# Patient Record
Sex: FEMALE | Race: WHITE
[De-identification: names, ages, dates, MRNs, and addresses within clinical notes are randomized per-mention and may not be internally consistent; named-entity substitution may affect disease eponyms.]

---

## 2018-02-01 ENCOUNTER — HOSPITAL ENCOUNTER (OUTPATIENT)
Dept: HOSPITAL 76 - DI | Age: 73
Discharge: HOME | End: 2018-02-01
Attending: FAMILY MEDICINE
Payer: MEDICARE

## 2018-02-01 DIAGNOSIS — K42.9: Primary | ICD-10-CM

## 2018-02-01 PROCEDURE — 76705 ECHO EXAM OF ABDOMEN: CPT

## 2018-02-01 NOTE — ULTRASOUND REPORT
EXAM:

ABDOMEN ULTRASOUND LIMITED, CENTRAL ABDOMEN.

 

EXAM DATE: 2/1/2018 08:16 PM.

 

CLINICAL HISTORY: Periumbilical swelling, mass or lump.

 

COMPARISON: None.

 

TECHNIQUE: Real-time scanning was performed with static images obtained in the area of concern corres
ponding to the umbilical region. 

 

FINDINGS: 

There is a nonreducible fat filled periumbilical hernia immediately inferior to the umbilicus measuri
ng 3.6 x 5.6 x 7.3 cm. Hernia neck measures 1.83 cm. No associated fluid collection. Arterial and maritza
ous blood flow seen within the hernia.

 

IMPRESSION: Non-reducible periumbilical fat filled hernia.

 

[facility]

Referring Provider Line: [phone]

 

SITE ID: [id number]

## 2018-02-01 NOTE — ULTRASOUND PRELIMINARY REPORT
Accession: [id number]

Exam: US ABDOMEN LIMITED

 

IMPRESSION: Non-reducible periumbilical fat filled hernia.

 

[facility]

 

SITE ID: [id number]

## 2018-03-01 ENCOUNTER — HOSPITAL ENCOUNTER (OUTPATIENT)
Dept: HOSPITAL 76 - LAB.N | Age: 73
Discharge: HOME | End: 2018-03-01
Attending: FAMILY MEDICINE
Payer: MEDICARE

## 2018-03-01 DIAGNOSIS — E78.5: ICD-10-CM

## 2018-03-01 DIAGNOSIS — M85.80: Primary | ICD-10-CM

## 2018-03-01 DIAGNOSIS — I10: ICD-10-CM

## 2018-03-01 LAB
BASOPHILS NFR BLD AUTO: 0 10^3/UL (ref 0–0.1)
BASOPHILS NFR BLD AUTO: 0.5 %
EOSINOPHIL # BLD AUTO: 0.2 10^3/UL (ref 0–0.7)
EOSINOPHIL NFR BLD AUTO: 3 %
ERYTHROCYTE [DISTWIDTH] IN BLOOD BY AUTOMATED COUNT: 14.4 % (ref 12–15)
HGB UR QL STRIP: 13.4 G/DL (ref 12–16)
LYMPHOCYTES # SPEC AUTO: 1.7 10^3/UL (ref 1.5–3.5)
LYMPHOCYTES NFR BLD AUTO: 25.9 %
MCH RBC QN AUTO: 26.6 PG (ref 27–31)
MCHC RBC AUTO-ENTMCNC: 32.2 G/DL (ref 32–36)
MCV RBC AUTO: 82.7 FL (ref 81–99)
MONOCYTES # BLD AUTO: 0.5 10^3/UL (ref 0–1)
MONOCYTES NFR BLD AUTO: 8 %
NEUTROPHILS # BLD AUTO: 4.1 10^3/UL (ref 1.5–6.6)
NEUTROPHILS # SNV AUTO: 6.6 X10^3/UL (ref 4.8–10.8)
NEUTROPHILS NFR BLD AUTO: 62.6 %
PDW BLD AUTO: 9.1 FL (ref 7.9–10.8)
PLATELET # BLD: 248 10^3/UL (ref 130–450)
RBC MAR: 5.04 10^6/UL (ref 4.2–5.4)

## 2018-03-01 PROCEDURE — 82306 VITAMIN D 25 HYDROXY: CPT

## 2018-03-01 PROCEDURE — 36415 COLL VENOUS BLD VENIPUNCTURE: CPT

## 2018-03-01 PROCEDURE — 80061 LIPID PANEL: CPT

## 2018-03-01 PROCEDURE — 80053 COMPREHEN METABOLIC PANEL: CPT

## 2018-03-01 PROCEDURE — 85025 COMPLETE CBC W/AUTO DIFF WBC: CPT

## 2018-03-01 PROCEDURE — 83721 ASSAY OF BLOOD LIPOPROTEIN: CPT

## 2018-03-02 LAB
ALBUMIN DIAFP-MCNC: 4 G/DL (ref 3.2–5.5)
ALBUMIN/GLOB SERPL: 1.3 {RATIO} (ref 1–2.2)
ALP SERPL-CCNC: 61 IU/L (ref 42–121)
ALT SERPL W P-5'-P-CCNC: 12 IU/L (ref 10–60)
ANION GAP SERPL CALCULATED.4IONS-SCNC: 10 MMOL/L (ref 6–13)
AST SERPL W P-5'-P-CCNC: 18 IU/L (ref 10–42)
BILIRUB BLD-MCNC: 0.8 MG/DL (ref 0.2–1)
BUN SERPL-MCNC: 20 MG/DL (ref 6–20)
CALCIUM UR-MCNC: 9.4 MG/DL (ref 8.5–10.3)
CHLORIDE SERPL-SCNC: 101 MMOL/L (ref 101–111)
CHOLEST SERPL-MCNC: 202 MG/DL
CO2 SERPL-SCNC: 27 MMOL/L (ref 21–32)
CREAT SERPLBLD-SCNC: 0.8 MG/DL (ref 0.4–1)
GFRSERPLBLD MDRD-ARVRAT: 71 ML/MIN/{1.73_M2} (ref 89–?)
GLOBULIN SER-MCNC: 3.2 G/DL (ref 2.1–4.2)
GLUCOSE SERPL-MCNC: 88 MG/DL (ref 70–100)
HDLC SERPL-MCNC: 43 MG/DL
HDLC SERPL: 4.7 {RATIO} (ref ?–4.4)
LDLC SERPL CALC-MCNC: 121 MG/DL
LDLC/HDLC SERPL: 2.8 {RATIO} (ref ?–4.4)
PROT SPEC-MCNC: 7.2 G/DL (ref 6.7–8.2)
SODIUM SERPLBLD-SCNC: 138 MMOL/L (ref 135–145)
VLDLC SERPL-SCNC: 38 MG/DL

## 2018-03-16 ENCOUNTER — HOSPITAL ENCOUNTER (OUTPATIENT)
Dept: HOSPITAL 76 - DI | Age: 73
Discharge: HOME | End: 2018-03-16
Attending: SURGERY
Payer: MEDICARE

## 2018-03-16 DIAGNOSIS — K43.9: ICD-10-CM

## 2018-03-16 DIAGNOSIS — R07.9: Primary | ICD-10-CM

## 2018-03-16 PROCEDURE — 71260 CT THORAX DX C+: CPT

## 2018-03-16 PROCEDURE — 74177 CT ABD & PELVIS W/CONTRAST: CPT

## 2018-03-16 NOTE — CT REPORT
ABDOMEN AND PELVIS CT: 03/16/2018

 

COMPARISON: None.

 

INDICATION:  Mid chest pain and ventral hernia.

 

TECHNIQUE:  Axial imaging of the abdomen and pelvis was performed with 
intravenous contrast.  

100 mL Isovue 300.  Coronal and sagittal reformat.

 

FINDINGS:

The liver has normal contour without masses.  The spleen, pancreas, adrenal 
glands, and kidneys

appear unremarkable, apart from a tiny low attenuating focus in the right kidney
, too small to 

characterize.

 

There is a right spigelian hernia.  It contains a loop of colon without 
evidence of bowel 

obstruction.

 

The uterus appears absent.

 

There is a presacral mass.  It measures 4.8 x 3.5 cm, axially.

 

No bone lesions.

 

IMPRESSION:  THERE IS A PRESACRAL MASS AS DETAILED ABOVE.  THE DIFFERENTIAL 
CONSIDERATIONS ARE 

BROAD BUT INCLUDE PRIMARILY NEOPLASTIC PROCESSES.  THIS MAY BE FURTHER 
EVALUATED WITH MRI.

 

In accordance with CT protocol optimization, one or more of the following dose 
reduction 

techniques were utilized for this exam:  automated exposure control, adjustment 
of mA and/or KV

based on patient size, or use of iterative reconstructive technique.

 

 

DD: 03/16/2018 16:03

TD: 03/16/2018 16:22

Job #: [id number]

MTDD

## 2018-03-19 NOTE — CT REPORT
CT CHEST:  03/16/2018

 

COMPARISON:  No comparison.

 

INDICATION:  Mid chest pain and ventral hernia.

 

TECHNIQUE:  Axial imaging of the chest was performed with intravenous contrast.
  100 mL Isovue 

300.  Coronal and sagittal reformats.  MIP lung reformats.

 

FINDINGS:  The lungs appear clear.  The airways are unremarkable.  No 
pneumothorax or pleural 

effusion.  No mediastinal or hilar adenopathy.  No axillary adenopathy.  
Thyroid gland grossly 

unremarkable.

 

Limited upper abdomen appears unremarkable.

 

IMPRESSION:  NO ACUTE OR SUSPICIOUS CT ABNORMALITIES OF THE THORAX.





CT DOSE REDUCTION STATEMENT

 

In accordance with CT protocol optimization, one or more of the following dose 
reduction 

techniques were utilized for this exam:  automated exposure control, adjustment 
of mA and/or KV

based on patient size, or use of iterative reconstructive technique.

 

 

DD: 03/16/2018 16:02

TD: 03/19/2018 08:56

Job #: [id number]

MTDD

## 2018-04-09 ENCOUNTER — HOSPITAL ENCOUNTER (OUTPATIENT)
Dept: HOSPITAL 76 - DI | Age: 73
Discharge: HOME | End: 2018-04-09
Attending: SURGERY
Payer: MEDICARE

## 2018-04-09 DIAGNOSIS — R19.09: Primary | ICD-10-CM

## 2018-04-09 LAB
CREAT SERPLBLD-SCNC: 0.8 MG/DL (ref 0.4–1)
GFRSERPLBLD MDRD-ARVRAT: 71 ML/MIN/{1.73_M2} (ref 89–?)

## 2018-04-09 PROCEDURE — 82565 ASSAY OF CREATININE: CPT

## 2018-04-09 PROCEDURE — 36415 COLL VENOUS BLD VENIPUNCTURE: CPT

## 2018-04-09 PROCEDURE — 72197 MRI PELVIS W/O & W/DYE: CPT

## 2018-04-09 NOTE — MRI REPORT
EXAM:

MR PELVIS WITH AND WITHOUT CONTRAST

 

EXAM DATE: 4/9/2018 02:57 PM.

 

CLINICAL HISTORY: Presacral mass on CT scan.

 

COMPARISON: CT from 03/16/2018.

 

TECHNIQUE: Multiplanar breath-hold T1, T2, and DWI sequences obtained through the pelvis on an MR sca
nner. Images obtained before and after administration of 9 mL Gadavist intravenous contrast.

 

FINDINGS:

On this examination, dominant feature is a posterior presacral retroperitoneal mass which shows fat i
ntensity and mixtures of solid soft tissue intensity that shows low-level enhancement. Series 1101 im
age 77, series 301 image 18. This appears to lie immediately adjacent to the anterior cortex at S2 an
d S3. Please note however that there is no bony erosive change, no marrow edema, no abnormal enhancem
ent.

 

A portion of this mass is immediately adjacent to the left S4 and S5 nerve roots, although these are 
quite small. S1, S2 and S3 nerve roots bilaterally are uninvolved.

 

Uterus and adnexal structures are either very small amount likely been removed.

 

Fat-containing right paracentral anterior abdominal wall hernia is about 4 x 3.6 cm transversely and 
extends for a 6.8 cm top to bottom extent.

 

Adjacent bones are normal.

 

IMPRESSION: 

1. As on the CT scan, dominant feature in the posterior presacral retroperitoneal mass with fat and s
oft tissue intensity. No distinct cystic components or "fluid-fluid levels." Differential diagnosis i
ncludes neuroma, neurofibroma, lipoma, liposarcoma. Teratoma while possibly is considered significant
ly less likely because of its location in the retroperitoneum. 

2. No other worrisome imaging features. No aggressive appearing margins, no bony erosive or destructi
ve changes, no free fluid or adenopathy.

 

[facility]

Referring Provider Line: [phone]

 

SITE ID: [id number]

## 2018-05-01 ENCOUNTER — HOSPITAL ENCOUNTER (OUTPATIENT)
Dept: HOSPITAL 76 - SDS | Age: 73
Discharge: HOME | End: 2018-05-01
Attending: SURGERY
Payer: MEDICARE

## 2018-05-01 VITALS — DIASTOLIC BLOOD PRESSURE: 61 MMHG | SYSTOLIC BLOOD PRESSURE: 119 MMHG

## 2018-05-01 DIAGNOSIS — K62.1: Primary | ICD-10-CM

## 2018-05-01 DIAGNOSIS — K57.30: ICD-10-CM

## 2018-05-01 PROCEDURE — 0DBP8ZX EXCISION OF RECTUM, VIA NATURAL OR ARTIFICIAL OPENING ENDOSCOPIC, DIAGNOSTIC: ICD-10-PCS | Performed by: SURGERY

## 2018-05-01 PROCEDURE — 45380 COLONOSCOPY AND BIOPSY: CPT

## 2018-05-01 PROCEDURE — 88305 TISSUE EXAM BY PATHOLOGIST: CPT

## 2018-10-24 ENCOUNTER — HOSPITAL ENCOUNTER (OUTPATIENT)
Dept: HOSPITAL 76 - LAB.N | Age: 73
Discharge: HOME | End: 2018-10-24
Attending: FAMILY MEDICINE
Payer: MEDICARE

## 2018-10-24 DIAGNOSIS — I10: Primary | ICD-10-CM

## 2018-10-24 LAB
ANION GAP SERPL CALCULATED.4IONS-SCNC: 6 MMOL/L (ref 6–13)
BUN SERPL-MCNC: 23 MG/DL (ref 6–20)
CALCIUM UR-MCNC: 8.8 MG/DL (ref 8.5–10.3)
CHLORIDE SERPL-SCNC: 105 MMOL/L (ref 101–111)
CO2 SERPL-SCNC: 28 MMOL/L (ref 21–32)
CREAT SERPLBLD-SCNC: 0.7 MG/DL (ref 0.4–1)
GFRSERPLBLD MDRD-ARVRAT: 82 ML/MIN/{1.73_M2} (ref 89–?)
GLUCOSE SERPL-MCNC: 81 MG/DL (ref 70–100)
SODIUM SERPLBLD-SCNC: 139 MMOL/L (ref 135–145)

## 2018-10-24 PROCEDURE — 80048 BASIC METABOLIC PNL TOTAL CA: CPT

## 2018-10-24 PROCEDURE — 36415 COLL VENOUS BLD VENIPUNCTURE: CPT

## 2019-03-04 ENCOUNTER — HOSPITAL ENCOUNTER (OUTPATIENT)
Dept: HOSPITAL 76 - DI | Age: 74
Discharge: HOME | End: 2019-03-04
Attending: SURGERY
Payer: MEDICARE

## 2019-03-04 DIAGNOSIS — R19.09: Primary | ICD-10-CM

## 2019-03-04 PROCEDURE — 72197 MRI PELVIS W/O & W/DYE: CPT

## 2019-03-06 NOTE — MRI REPORT
Reason:  PRESACRAL MASS

Procedure Date:  03/04/2019   

Accession Number:  [id number] / [id number]                    

Procedure:  MRI - Pelvis W/WO CPT Code:  

 

FULL RESULT:

 

 

EXAM:

MRI PELVIS WITHOUT AND WITH CONTRAST

 

EXAM DATE: 3/4/2019 03:34 PM.

 

CLINICAL HISTORY: Presacral mass. Resection 2 years ago. Biopsy was 

reportedly negative for adenoma and carcinoma.

 

COMPARISON: MRI pelvis 04/09/2018, CT abdomen and pelvis 03/16/2018.

 

TECHNIQUE: Multiplanar, multisequence T1-weighted and fluid-sensitive 

sequences of the pelvis before and after administration of intravenous 

contrast. IV contrast: 10 mL Gadavist. Other: None.

 

FINDINGS:

Bones: No fractures or subluxations. No marrow edema or abnormal 

enhancement. No bone lesions.

 

Lower Lumbar Spine: Unremarkable.

 

Sacroiliac Joints: No effusion or sacroiliitis.

 

Right Hip: No acetabular retroversion. Femoral head/neck offset is within 

normal limits. No effusion.

 

Left Hip: A full-thickness tear of the anterosuperior left labrum to be 

seen on series 1201, image 121).

 

Symphysis Pubis: Unremarkable.

 

Musculature: No edema or fatty atrophy.

 

Pelvic Cavity: The patient has had a hysterectomy. The visualized bowel 

is unremarkable. A presacral mass is seen that is smaller than what was 

present on the prior examination. This layers posteriorly and only has 

some minimal posterior enhancement. It is T2 hyperintense and T1 

hypointense. This could be a combination of granulation tissue, fluid, 

and some residual of the prior tumor seen at this location. Overall, the 

abnormal soft tissue at this location measures 1.8 x 0.9 x 5.6 cm. More 

of this layers along the sacrum than was present on the prior 

examination. Again note that the previous mass at this location measured 

as much as 6.6 cm. No lymphadenopathy.

 

Other: The visualized sciatic nerves are unremarkable. No bursitis. The 

subcutaneous tissues are unremarkable. No abscess or cellulitis.

IMPRESSION:

1. Much of the presacral mass has been removed since the prior 

examination. Abnormal soft tissue anterior to the sacrum is likely a 

combination of residual tumor, postsurgical fluid, and granulation 

tissue. Recommend correlation to the prior pathology report. At present, 

the soft tissue anterior to the sacrum does not appear to have any effect 

on the adjacent bone and there is no pathologic lymphadenopathy by 

imaging.

2. Full-thickness tear in the anterior superior left labrum.

 

[facility] MUSCULOSKELETAL RADIOLOGY SECTION

## 2019-10-28 ENCOUNTER — HOSPITAL ENCOUNTER (OUTPATIENT)
Dept: HOSPITAL 76 - EMS | Age: 74
Discharge: ANOTHER HOSPITAL | End: 2019-10-28
Attending: SURGERY
Payer: MEDICARE

## 2019-10-28 ENCOUNTER — HOSPITAL ENCOUNTER (OUTPATIENT)
Dept: HOSPITAL 76 - ED | Age: 74
Discharge: ANOTHER HOSPITAL | End: 2019-10-28
Attending: ORTHOPAEDIC SURGERY
Payer: MEDICARE

## 2019-10-28 VITALS — SYSTOLIC BLOOD PRESSURE: 95 MMHG | DIASTOLIC BLOOD PRESSURE: 62 MMHG

## 2019-10-28 DIAGNOSIS — S82.51XA: Primary | ICD-10-CM

## 2019-10-28 DIAGNOSIS — M54.9: Primary | ICD-10-CM

## 2019-10-28 DIAGNOSIS — M79.671: ICD-10-CM

## 2019-10-28 DIAGNOSIS — M79.652: ICD-10-CM

## 2019-10-28 DIAGNOSIS — S80.12XA: ICD-10-CM

## 2019-10-28 DIAGNOSIS — M48.02: ICD-10-CM

## 2019-10-28 DIAGNOSIS — M47.812: ICD-10-CM
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
## 2019-10-28 DIAGNOSIS — I10: ICD-10-CM

## 2019-10-28 DIAGNOSIS — Z96.651: ICD-10-CM

## 2019-10-28 DIAGNOSIS — Z79.899: ICD-10-CM

## 2019-10-28 DIAGNOSIS — M79.651: ICD-10-CM

## 2019-10-28 DIAGNOSIS — S32.591A: ICD-10-CM

## 2019-10-28 DIAGNOSIS — S42.022A: ICD-10-CM

## 2019-10-28 DIAGNOSIS — M50.91: ICD-10-CM

## 2019-10-28 DIAGNOSIS — M79.672: ICD-10-CM
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
## 2019-10-28 DIAGNOSIS — S32.301A: ICD-10-CM

## 2019-10-28 DIAGNOSIS — S32.511A: ICD-10-CM

## 2019-10-28 LAB
ALBUMIN DIAFP-MCNC: 3.5 G/DL (ref 3.2–5.5)
ALBUMIN/GLOB SERPL: 1 {RATIO} (ref 1–2.2)
ALP SERPL-CCNC: 60 IU/L (ref 42–121)
ALT SERPL W P-5'-P-CCNC: 23 IU/L (ref 10–60)
ANION GAP SERPL CALCULATED.4IONS-SCNC: 12 MMOL/L (ref 6–13)
AST SERPL W P-5'-P-CCNC: 34 IU/L (ref 10–42)
BASOPHILS # BLD MANUAL: 0.2 10^3/UL (ref 0–0.1)
BASOPHILS NFR BLD AUTO: 0.6 %
BASOPHILS NFR SPEC MANUAL: 1 %
BILIRUB BLD-MCNC: 1.2 MG/DL (ref 0.2–1)
BUN SERPL-MCNC: 29 MG/DL (ref 6–20)
CALCIUM UR-MCNC: 8.8 MG/DL (ref 8.5–10.3)
CHLORIDE SERPL-SCNC: 100 MMOL/L (ref 101–111)
CO2 SERPL-SCNC: 25 MMOL/L (ref 21–32)
CREAT SERPLBLD-SCNC: 0.9 MG/DL (ref 0.4–1)
EOSINOPHIL # BLD MANUAL: 0.8 10^3/UL (ref 0–0.7)
EOSINOPHIL NFR BLD AUTO: 0.7 %
ERYTHROCYTE [DISTWIDTH] IN BLOOD BY AUTOMATED COUNT: 14.3 % (ref 12–15)
GFRSERPLBLD MDRD-ARVRAT: 61 ML/MIN/{1.73_M2} (ref 89–?)
GLOBULIN SER-MCNC: 3.4 G/DL (ref 2.1–4.2)
GLUCOSE SERPL-MCNC: 171 MG/DL (ref 70–100)
HGB UR QL STRIP: 10.8 G/DL (ref 12–16)
HGB UR QL STRIP: 12.9 G/DL (ref 12–16)
INR PPP: 1.1 (ref 0.8–1.2)
LIPASE SERPL-CCNC: 21 U/L (ref 22–51)
LYMPH ABN NFR BLD MANUAL: 0 %
LYMPHOBLASTS # BLD: 26 %
LYMPHOCYTES # BLD MANUAL: 5.3 10^3/UL (ref 1.5–3.5)
LYMPHOCYTES NFR BLD AUTO: 18 %
MANUAL DIF COMMENT BLD-IMP: (no result)
MCH RBC QN AUTO: 26.4 PG (ref 27–31)
MCHC RBC AUTO-ENTMCNC: 31.2 G/DL (ref 32–36)
MCV RBC AUTO: 84.6 FL (ref 81–99)
METAMYELOCYTES NFR BLD: 1 %
MONOCYTES # BLD MANUAL: 1 10^3/UL (ref 0–1)
MONOCYTES NFR BLD AUTO: 5.6 %
NEUTROPHILS # SNV AUTO: 20.4 X10^3/UL (ref 4.8–10.8)
NEUTROPHILS NFR BLD AUTO: 73 %
NEUTS BAND NFR BLD: 3 %
PDW BLD AUTO: 10.7 FL (ref 7.9–10.8)
PLAT MORPH BLD: (no result)
PLATELET # BLD: 303 10^3/UL (ref 130–450)
PLATELET BLD QL SMEAR: (no result)
PROT SPEC-MCNC: 6.9 G/DL (ref 6.7–8.2)
PROTHROM ACT/NOR PPP: 12.8 SECS (ref 9.9–12.6)
RBC MAR: 4.88 10^6/UL (ref 4.2–5.4)
RBC MORPH BLD: (no result)
SODIUM SERPLBLD-SCNC: 137 MMOL/L (ref 135–145)

## 2019-10-28 PROCEDURE — 73552 X-RAY EXAM OF FEMUR 2/>: CPT

## 2019-10-28 PROCEDURE — 74177 CT ABD & PELVIS W/CONTRAST: CPT

## 2019-10-28 PROCEDURE — 83690 ASSAY OF LIPASE: CPT

## 2019-10-28 PROCEDURE — 85610 PROTHROMBIN TIME: CPT

## 2019-10-28 PROCEDURE — 71260 CT THORAX DX C+: CPT

## 2019-10-28 PROCEDURE — 70450 CT HEAD/BRAIN W/O DYE: CPT

## 2019-10-28 PROCEDURE — 99285 EMERGENCY DEPT VISIT HI MDM: CPT

## 2019-10-28 PROCEDURE — 96361 HYDRATE IV INFUSION ADD-ON: CPT

## 2019-10-28 PROCEDURE — 96374 THER/PROPH/DIAG INJ IV PUSH: CPT

## 2019-10-28 PROCEDURE — 72125 CT NECK SPINE W/O DYE: CPT

## 2019-10-28 PROCEDURE — 27766 OPTX MEDIAL ANKLE FX: CPT

## 2019-10-28 PROCEDURE — 80053 COMPREHEN METABOLIC PANEL: CPT

## 2019-10-28 PROCEDURE — 85025 COMPLETE CBC W/AUTO DIFF WBC: CPT

## 2019-10-28 PROCEDURE — 36415 COLL VENOUS BLD VENIPUNCTURE: CPT

## 2019-10-28 PROCEDURE — 72170 X-RAY EXAM OF PELVIS: CPT

## 2019-10-28 PROCEDURE — 73630 X-RAY EXAM OF FOOT: CPT

## 2019-10-28 PROCEDURE — 85014 HEMATOCRIT: CPT

## 2019-10-28 PROCEDURE — 99284 EMERGENCY DEPT VISIT MOD MDM: CPT

## 2019-10-28 PROCEDURE — 85018 HEMOGLOBIN: CPT

## 2019-10-28 PROCEDURE — 73610 X-RAY EXAM OF ANKLE: CPT

## 2019-10-28 PROCEDURE — 73590 X-RAY EXAM OF LOWER LEG: CPT

## 2019-10-28 PROCEDURE — 0QSG04Z REPOSITION RIGHT TIBIA WITH INTERNAL FIXATION DEVICE, OPEN APPROACH: ICD-10-PCS | Performed by: ORTHOPAEDIC SURGERY
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
## 2019-10-28 NOTE — XRAY REPORT
Reason:  Right ankle ORIF medial malioli

Procedure Date:  10/28/2019   

Accession Number:  [id number] / [id number]                    

Procedure:  FL  - OR C-Arm Procedure CPT Code:  

 

FULL RESULT:

 

 

EXAM:

FLUOROSCOPIC GUIDANCE

 

EXAM DATE: 10/28/2019 06:09 PM.

 

CLINICAL HISTORY: Right ankle ORIF medial malioli.

 

COMPARISON: None.

 

FINDINGS: Multiple fluoroscopic images demonstrating fixation of right 

medial malleolar fracture.

IMPRESSION: Fluoroscopic guidance provided for right medial malleolar 

ORIF. Total fluoroscopy dose: 0.04 mGy.  Number of images: 5.

 

[facility]
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
## 2019-10-28 NOTE — XRAY REPORT
Reason:  fracture

Procedure Date:  10/28/2019   

Accession Number:  [id number] / [id number]                    

Procedure:  XR  - Ankle 3 View RT CPT Code:  

 

FULL RESULT:

 

 

EXAM:

RIGHT ANKLE RADIOGRAPHY

 

EXAM DATE: 10/28/2019 01:33 PM.

 

CLINICAL HISTORY: Fracture.

 

COMPARISON: LEG LOWER BILAT 10/28/2019 1:09 PM.

 

TECHNIQUE: 3 views.

 

FINDINGS:

Bones: Decreased bone mineralization. Plantar spur. Displaced horizontal 

medial malleoli fracture with 6 mm displacement.

 

Joints: No dislocation. Symmetric mortise. Preserved tibiotalar joint.

 

Soft Tissues: Ankle swelling, greatest medially

IMPRESSION:

1. Displaced medial malleolus fracture.

2. Decreased bone mineralization.

3. Ankle swelling, greatest medially

 

[facility]

## 2019-10-28 NOTE — OPERATIVE REPORT
DATE OF SERVICE: 10/28/2019

Physician: Richard Allen MD

 

PREOPERATIVE DIAGNOSES  

1.  Closed, angulated right medial malleolar ankle fracture. 

2.  Pelvic fracture. 

3.  Left clavicle fracture.

 

POSTOPERATIVE DIAGNOSES  

1.  Closed, angulated right medial malleolar ankle fracture. 

2.  Pelvic fracture. 

3.  Left clavicle fracture.

 

PROCEDURE PERFORMED:  Open reduction and internal fixation of right medial malleolar ankle fracture u
sing 2 cannulated screws.

 

SURGEON:  Richard Allen MD

 

ANESTHESIA:  General.

 

DESCRIPTION OF PROCEDURE:  Patient was taken to the operating room on the afternoon of 10/28/2019, wh
ere she was placed under general anesthetic in the supine position without any complications.  We masood
lied a thigh pneumatic tourniquet to the right lower extremity, but did not inflate it during our marybeth
e.  A rolled towel was placed underneath the right hip as well.  We then prepped and draped the ankle
 free in the usual fashion for our procedure.  Through a mid medial skin incision over the medial asp
ect of the ankle.  We dissected down to the fracture.  Periosteal elevator was used to strip the jeannine
osteum on the fracture border proximally and distally.  Curette used to remove the fracture hematoma 
at the fracture site as well.  Irrigation used to wash the wound.  Using towel clip reduction clamp, 
we were able to reduce the medial malleolar fracture nearly anatomically.  It was held in place with 
a second towel clip reduction clamp.  Fluoroscopic views in AP and lateral projection, showed a good 
reduction of our fracture as well.  We then proceeded to insert 2 threaded tipped guidewires under po
wer across the fracture.  We determined a 34 mm short threaded 4.0 mm cannulated screws would be util
ized.  The cannulated drill was then used to perforate the cortex using our guide pins.  After using 
our drill.  We then used the selected cannulated screws and inserted these over our inserted guide pi
ns.  We obtained good fracture and compression with these screws.  Fluoroscopic views in AP and later
al projection, showed a good reduction of our fracture.  Ankle mortise reestablished in AP and latera
l projections.  Permanent x-rays were sent to radiology.  We then removed the guide pins from the tib
ia, irrigated the wounds again with saline, then closed the wound in layers using several buried simp
le stitches of 2-0 Vicryl to approximate the subcutaneous tissues, followed by skin staples to approx
imate the medial ankle wound.  Patient then had the wound dressed with Xeroform gauze, 4 x 4's, steri
le Webril, and a short-leg posterior splint with stirrups applied to the lower extremity.  Patient tr
ansferred off the fracture table onto her bed and taken to the recovery room in satisfactory conditio
n.

 

ESTIMATED BLOOD LOSS:  50 mL

 

REPLACEMENT:  700 mL crystalloid.

 

INTRAOPERATIVE COMPLICATIONS:  None.  

 

PLAN:  Patient will be mobilized as tolerated.  We will likely only be able to do a pivot shift trans
fers from bed to chair with her left lower extremity.  Will essentially be wheelchair bound for the n
ext several weeks due to her multiple fractures.

 

 

DD: 10/28/2019 17:51

TD: 10/28/2019 17:55

Job #: [id number]
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
## 2019-10-28 NOTE — OPERATIVE REPORT
Operative Report





- General


Procedure Date: 10/28/19


Planned Procedure: 





ORIF of right medial malleolar ankle fracture


Pre-Op Diagnosis: Angulated closed right medial malleolar ankle fracture,pelvic 

& clavicle fx


Procedure Performed: 





Cannulated screw fixation of right medial malleolar ankle fracture


Post Op Diagnosis: Same





- Procedure Note


Primary Surgeon: MONTY Allen MD


Anesthesia Provider: ANI Barreto CRNA


Anesthesia Technique: General ET tube


IV Fluids (mL): 700


Estimated Blood Loss (mL): 50


Complications: 





None

## 2019-10-28 NOTE — ANESTHESIA
Pre-Anesthesia VS, & Labs





- Diagnosis





R ankle fx





- Procedure





ORIF R ankle


Vital Signs: 





                                        











Temp Pulse Resp BP Pulse Ox


 


 36 C L  79   23   95/59 L  100 


 


 10/28/19 12:30  10/28/19 15:00  10/28/19 15:00  10/28/19 15:00  10/28/19 15:00














                                        





Height                           5 ft 3 in


Weight (kg)                      90.718 kg


Body Mass Index                  35.4











- NPO


>8 hours


Last Fluid Intake: coffee w/cream @0600





- Pregnancy


Is Patient Pregnant?: No





- Lab Results


Current Lab Results: 





Laboratory Tests





10/28/19 12:47: Sodium 137, Potassium 3.5, Chloride 100 L, Carbon Dioxide 25, 

Anion Gap 12.0, BUN 29 H, Creatinine 0.9, Estimated GFR (MDRD) 61 L, Glucose 171

H, Calcium 8.8, Total Bilirubin 1.2 H, AST 34, ALT 23, Alkaline Phosphatase 60, 

Total Protein 6.9, Albumin 3.5, Globulin 3.4, Albumin/Globulin Ratio 1.0, Lipase

21 L


10/28/19 12:47: PT 12.8 H, INR 1.1


10/28/19 12:47: WBC 20.4 H, RBC 4.88, Hgb 12.9, Hct 41.3, MCV 84.6, MCH 26.4 L, 

MCHC 31.2 L, RDW 14.3, Plt Count 303, MPV 10.7, Neut # (Auto) Not Reportable, 

Lymph # (Auto) Not Reportable, Mono # (Auto) Not Reportable, Eos # (Auto) Not 

Reportable, Baso # (Auto) Not Reportable, Absolute Nucleated RBC Not Reportable,

Total Counted 100, Band Neuts % (Manual) 3, Abnorm Lymph % (Manual) 0, 

Metamyelocytes % 1 H, Nucleated RBC % Not Reportable, Neutrophils # (Manual) 

12.9 H, Lymphocytes # (Manual) 5.3 H, Monocytes # (Manual) 1.0, Eosinophils # 

(Manual) 0.8 H, Basophils # (Manual) 0.2 H, Differential Comment MANUAL 

DIFFERENTIAL, WBC Morphology 1+ SMUDGE CELLS, Platelet Estimate NORMAL (130-

450,000), Platelet Morphology NORMAL APPEARANCE, RBC Morph Micro Appear NORMAL 

APPEARANCE








Fish Bones: 


                                 10/28/19 12:47





                                 10/28/19 12:47





Home Medications and Allergies


Home Medications: 


Ambulatory Orders





Loratadine [Allergy Relief] 10 mg PO AC 10/28/19 











                                        





Hydrochlorothiazide 25 mg PO DAILY 07/20/16 


Loratadine [Allergy Relief] 10 mg PO AC 10/28/19 








Allergies/Adverse Reactions: 


                                    Allergies











Allergy/AdvReac Type Severity Reaction Status Date / Time


 


adhesive Allergy Intermediate WELTS/HIVES Verified 06/12/13 14:42


 


lisinopril AdvReac  Unknown Verified 04/30/18 13:52














Anes History & Medical History





- Anesthetic History


Anesthesia Complications: reports: No previous complications


Family history of Anesthesia Complications: Denies


Family history of Malignant Hyperthermia: Denies





- Medical History


Cardiovascular: reports: Hypertension


Pulmonary: reports: None


Gastrointestinal: reports: Other


Urinary: reports: None


Musculoskeletal: reports: Osteoarthritis


Endocrine/Autoimmune: reports: None


Blood Disorders: reports: None


Skin: reports: None


Smoking Status: Never smoker





- Surgical History


General: Colonoscopy


Urologic: Bladder surgery


Gynecologic: Hysterectomy


Orthopedic: Knee replacement





Exam


General: Alert, Oriented x3, Cooperative


Dental: WNL, Dentures full Upper


Mouth Opening: 3 Fingerbreadth


Neck Mobility: Normal


Mallampati classification: II


Respiratory: Lungs clear


Cardiovascular: Regular rate


Neurological: Normal gait, Normal speech


Mental/Cognitive Status: Alert/Oriented X3, Normal for patient


Cognitive Status: Within normal limits





Plan


Anesthesia Type: General


Consent for Procedure(s) Verified and Reviewed: Yes


Code Status: Attempt Resuscitation


ASA classification: 2-Mild systemic disease


Is this case an emergency?: Yes
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
## 2020-03-15 ENCOUNTER — HOSPITAL ENCOUNTER (EMERGENCY)
Dept: HOSPITAL 76 - ED | Age: 75
Discharge: HOME | End: 2020-03-15
Payer: MEDICARE

## 2020-03-15 VITALS — SYSTOLIC BLOOD PRESSURE: 140 MMHG | DIASTOLIC BLOOD PRESSURE: 72 MMHG

## 2020-03-15 DIAGNOSIS — M19.029: ICD-10-CM

## 2020-03-15 DIAGNOSIS — L50.0: Primary | ICD-10-CM

## 2020-03-15 DIAGNOSIS — Z96.659: ICD-10-CM

## 2020-03-15 DIAGNOSIS — I10: ICD-10-CM

## 2020-03-15 DIAGNOSIS — T50.905A: ICD-10-CM

## 2020-03-15 PROCEDURE — 99284 EMERGENCY DEPT VISIT MOD MDM: CPT
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
## 2020-03-18 ENCOUNTER — HOSPITAL ENCOUNTER (EMERGENCY)
Dept: HOSPITAL 76 - ED | Age: 75
Discharge: HOME | End: 2020-03-18
Payer: MEDICARE

## 2020-03-18 ENCOUNTER — HOSPITAL ENCOUNTER (OUTPATIENT)
Dept: HOSPITAL 76 - LAB.N | Age: 75
Discharge: HOME | End: 2020-03-18
Attending: FAMILY MEDICINE
Payer: MEDICARE

## 2020-03-18 VITALS — DIASTOLIC BLOOD PRESSURE: 92 MMHG | SYSTOLIC BLOOD PRESSURE: 161 MMHG

## 2020-03-18 DIAGNOSIS — E78.5: Primary | ICD-10-CM

## 2020-03-18 DIAGNOSIS — I10: ICD-10-CM

## 2020-03-18 DIAGNOSIS — E78.5: ICD-10-CM

## 2020-03-18 DIAGNOSIS — M62.830: Primary | ICD-10-CM

## 2020-03-18 LAB
ALBUMIN DIAFP-MCNC: 4.2 G/DL (ref 3.2–5.5)
ALBUMIN/GLOB SERPL: 1.1 {RATIO} (ref 1–2.2)
ALP SERPL-CCNC: 64 IU/L (ref 42–121)
ALT SERPL W P-5'-P-CCNC: 16 IU/L (ref 10–60)
ANION GAP SERPL CALCULATED.4IONS-SCNC: 11 MMOL/L (ref 6–13)
AST SERPL W P-5'-P-CCNC: 16 IU/L (ref 10–42)
BASOPHILS NFR BLD AUTO: 0 10^3/UL (ref 0–0.1)
BASOPHILS NFR BLD AUTO: 0.4 %
BILIRUB BLD-MCNC: 0.4 MG/DL (ref 0.2–1)
BUN SERPL-MCNC: 36 MG/DL (ref 6–20)
CALCIUM UR-MCNC: 9.7 MG/DL (ref 8.5–10.3)
CHLORIDE SERPL-SCNC: 99 MMOL/L (ref 101–111)
CHOLEST SERPL-MCNC: 214 MG/DL
CO2 SERPL-SCNC: 28 MMOL/L (ref 21–32)
CREAT SERPLBLD-SCNC: 0.7 MG/DL (ref 0.4–1)
EOSINOPHIL # BLD AUTO: 0 10^3/UL (ref 0–0.7)
EOSINOPHIL NFR BLD AUTO: 0.1 %
ERYTHROCYTE [DISTWIDTH] IN BLOOD BY AUTOMATED COUNT: 17.6 % (ref 12–15)
GLOBULIN SER-MCNC: 3.7 G/DL (ref 2.1–4.2)
GLUCOSE SERPL-MCNC: 111 MG/DL (ref 70–100)
HDLC SERPL-MCNC: 47 MG/DL
HDLC SERPL: 4.6 {RATIO} (ref ?–4.4)
HGB UR QL STRIP: 13 G/DL (ref 12–16)
LDLC SERPL CALC-MCNC: 113 MG/DL
LDLC/HDLC SERPL: 2.4 {RATIO} (ref ?–4.4)
LYMPHOCYTES # SPEC AUTO: 2.4 10^3/UL (ref 1.5–3.5)
LYMPHOCYTES NFR BLD AUTO: 24.9 %
MCH RBC QN AUTO: 25.7 PG (ref 27–31)
MCHC RBC AUTO-ENTMCNC: 30 G/DL (ref 32–36)
MCV RBC AUTO: 85.8 FL (ref 81–99)
MONOCYTES # BLD AUTO: 0.7 10^3/UL (ref 0–1)
MONOCYTES NFR BLD AUTO: 7.1 %
NEUTROPHILS # BLD AUTO: 6.4 10^3/UL (ref 1.5–6.6)
NEUTROPHILS # SNV AUTO: 9.7 X10^3/UL (ref 4.8–10.8)
NEUTROPHILS NFR BLD AUTO: 65.8 %
PDW BLD AUTO: 11.9 FL (ref 7.9–10.8)
PLATELET # BLD: 405 10^3/UL (ref 130–450)
PROT SPEC-MCNC: 7.9 G/DL (ref 6.7–8.2)
RBC MAR: 5.06 10^6/UL (ref 4.2–5.4)
SODIUM SERPLBLD-SCNC: 138 MMOL/L (ref 135–145)
VLDLC SERPL-SCNC: 54 MG/DL

## 2020-03-18 PROCEDURE — 36415 COLL VENOUS BLD VENIPUNCTURE: CPT

## 2020-03-18 PROCEDURE — 83721 ASSAY OF BLOOD LIPOPROTEIN: CPT

## 2020-03-18 PROCEDURE — 80053 COMPREHEN METABOLIC PANEL: CPT

## 2020-03-18 PROCEDURE — 85025 COMPLETE CBC W/AUTO DIFF WBC: CPT

## 2020-03-18 PROCEDURE — 80061 LIPID PANEL: CPT

## 2020-03-18 PROCEDURE — 96372 THER/PROPH/DIAG INJ SC/IM: CPT

## 2020-03-18 PROCEDURE — 84443 ASSAY THYROID STIM HORMONE: CPT

## 2020-03-18 PROCEDURE — 99284 EMERGENCY DEPT VISIT MOD MDM: CPT

## 2020-03-18 PROCEDURE — 99283 EMERGENCY DEPT VISIT LOW MDM: CPT
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
## 2020-04-11 ENCOUNTER — HOSPITAL ENCOUNTER (OUTPATIENT)
Dept: HOSPITAL 76 - DI | Age: 75
Discharge: HOME | End: 2020-04-11
Attending: PHYSICIAN ASSISTANT
Payer: MEDICARE

## 2020-04-11 DIAGNOSIS — R60.0: Primary | ICD-10-CM

## 2020-04-11 PROCEDURE — 76882 US LMTD JT/FCL EVL NVASC XTR: CPT
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
## 2020-05-18 ENCOUNTER — HOSPITAL ENCOUNTER (OUTPATIENT)
Dept: HOSPITAL 76 - DI.WCP | Age: 75
Discharge: HOME | End: 2020-05-18
Attending: ORTHOPAEDIC SURGERY
Payer: MEDICARE

## 2020-05-18 DIAGNOSIS — S82.891A: Primary | ICD-10-CM

## 2020-05-19 NOTE — XRAY REPORT
Reason:  RIGHT ANKLE FRACTURE

Procedure Date:  05/18/2020   

Accession Number:  [id number] / [id number]                    

Procedure:  WCP - Ankle 3 View RT CPT Code:  

 

***Final Report***

 

 

FULL RESULT:

 

 

EXAM:

RIGHT ANKLE RADIOGRAPHY

 

EXAM DATE: 5/18/2020 02:42 PM.

 

CLINICAL HISTORY: RIGHT ANKLE FRACTURE.

 

COMPARISON: ANKLE 3 VIEW RT 10/28/2019 1:20 PM.

 

TECHNIQUE: 3 views.

 

FINDINGS:

Bones:  Interval ORIF of medial malleolar avulsion utilizing 2 screws. 

Excellent alignment.

 

Potential lateral talar process fracture.

 

Joints: Normal. No effusion. No subluxations. The ankle mortise is 

normally aligned.

 

Soft Tissues: Diffuse soft tissue swelling. Plantar calcaneal 

enthesophyte.

IMPRESSION:

1. Interval ORIF of right ankle medial malleolar avulsion utilizing 2 

screws. Excellent alignment.

2. Potential lateral talar process fracture. Consider CT to better 

characterize.

 

[facility]

## 2020-06-11 ENCOUNTER — HOSPITAL ENCOUNTER (OUTPATIENT)
Dept: HOSPITAL 76 - DI | Age: 75
Discharge: HOME | End: 2020-06-11
Attending: FAMILY MEDICINE
Payer: MEDICARE

## 2020-06-11 DIAGNOSIS — M19.011: Primary | ICD-10-CM

## 2020-06-11 NOTE — XRAY REPORT
Reason:  SHOULDER JOINT PAIN, RIGHT

Procedure Date:  06/11/2020   

Accession Number:  [id number] / [id number]                    

Procedure:  XR  - Shoulder 3 View RT CPT Code:  

 

***Final Report***

 

 

FULL RESULT:

 

 

PROCEDURE:  Shoulder 3 View RT

 

INDICATIONS:  SHOULDER JOINT PAIN, RIGHT

 

TECHNIQUE:  3 views of the shoulder were acquired.

 

COMPARISON:  None.

 

FINDINGS:

 

Bones:  No fractures or dislocations.  No suspicious bony lesions.  

Visualized ribs appear intact.  Mild periarticular osteophyte formation 

at the acromioclavicular and glenohumeral joints.

 

Soft tissues:  No suspicious soft tissue calcifications.

 

IMPRESSION:  Osteoarthritis. No acute fracture. No osseous lesion. If 

symptoms and/or clinical suspicion for pathology continue, further 

assessment with repeat plain films, or advanced imaging (e.g., CT, MRI, 

or bone scan) is recommended for further assessment.

 

Reviewed by: Jennifer Santos MD on 6/11/2020 1:26 PM PDT

Approved by: Jennifer Santos MD on 6/11/2020 1:26 PM PDT

 

 

Station ID:  [id number]

## 2020-06-12 ENCOUNTER — HOSPITAL ENCOUNTER (OUTPATIENT)
Dept: HOSPITAL 76 - LAB | Age: 75
Discharge: HOME | End: 2020-06-12
Attending: SURGERY
Payer: MEDICARE

## 2020-06-12 DIAGNOSIS — T79.2XXS: Primary | ICD-10-CM

## 2020-06-12 DIAGNOSIS — Z11.59: ICD-10-CM

## 2020-06-12 PROCEDURE — 81599 UNLISTED MAAA: CPT

## 2020-06-16 ENCOUNTER — HOSPITAL ENCOUNTER (OUTPATIENT)
Dept: HOSPITAL 76 - DI | Age: 75
Discharge: HOME | End: 2020-06-16
Attending: PHYSICIAN ASSISTANT
Payer: MEDICARE

## 2020-06-16 DIAGNOSIS — M47.814: Primary | ICD-10-CM

## 2020-06-16 DIAGNOSIS — M47.816: ICD-10-CM

## 2020-06-16 DIAGNOSIS — M85.88: ICD-10-CM

## 2020-06-16 PROCEDURE — 72072 X-RAY EXAM THORAC SPINE 3VWS: CPT

## 2020-06-16 PROCEDURE — 72100 X-RAY EXAM L-S SPINE 2/3 VWS: CPT

## 2020-06-16 NOTE — XRAY REPORT
PROCEDURE:  Lumbar Spine 2 View

 

INDICATIONS:  THORACIC   LOW BACK PAIN,ACUTE

 

TECHNIQUE:  2 views of the lumbar spine were acquired.  

 

COMPARISON:  CT abdomen and pelvis dated 10/28/2019.

 

FINDINGS:  

 

Bones:  5 non-rib-bearing vertebrae are present.  Diffuse osteopenia. There is normal bony alignment.
  No acute vertebral body compression fractures.  No suspicious bony lesions.  Status post surgical f
ixation across the right sacroiliac joint with 2 fixation screws. No evidence for hardware loosening 
or failure. Multilevel lumbar spondylitic changes with chronic appearing anterior compression deformi
ty of the L1 vertebral body. This is not significantly changed from comparison CT of 10/20/2019. Heal
ing fracture deformities of the right superior and inferior pubic rami.

 

Soft tissues:  Overlying bowel gas pattern is normal.  No suspicious soft tissue calcifications.  

 

IMPRESSION:  

 

1. Lumbar spine without acute fracture or malalignment.

 

2. Multilevel lumbar spondylosis.

 

3. Status post ORIF of right pelvic fracture. No hardware complication.

 

Reviewed by: Femi Alfaro MD on 6/16/2020 1:16 PM PDT

Approved by: Feim Alfaro MD on 6/16/2020 1:16 PM PDT

 

 

Station ID:  [id number]

## 2020-06-16 NOTE — XRAY REPORT
PROCEDURE:  Thoracic Spine 3 View

 

INDICATIONS:  THORACIC   LOW BACK PAIN,ACUTE

 

TECHNIQUE:  3 views of the thoracic spine were acquired.  

 

COMPARISON:  None.

 

FINDINGS:  

 

Bones:  Diffuse osteopenia. Multilevel thoracic spondylitic changes throughout the imaged spine. No f
ractures or dislocations.  No suspicious bony lesions.  12 pairs of ribs are noted, and appear intact
 where visualized.  

 

Soft tissues:  No paravertebral stripe thickening.  

 

IMPRESSION:  

Thoracic spine without acute fracture. Diffuse osteopenia. Multilevel thoracic spondylosis.

 

Reviewed by: Femi Alfaro MD on 6/16/2020 12:57 PM PDT

Approved by: Femi Alfaro MD on 6/16/2020 12:57 PM PDT

 

 

Station ID:  [id number]

## 2020-07-15 ENCOUNTER — HOSPITAL ENCOUNTER (OUTPATIENT)
Dept: HOSPITAL 76 - DI | Age: 75
Discharge: HOME | End: 2020-07-15
Attending: PHYSICIAN ASSISTANT
Payer: MEDICARE

## 2020-07-15 DIAGNOSIS — S32.301A: Primary | ICD-10-CM

## 2020-07-15 PROCEDURE — 72190 X-RAY EXAM OF PELVIS: CPT

## 2020-07-15 NOTE — XRAY REPORT
PROCEDURE:  Pelvis 3 View

 

INDICATIONS:  UNSPECIFIED FRACTURE OF RT ILIUM

 

TECHNIQUE:  3 view(s) of the pelvis acquired.  

 

COMPARISON:  Prior CT scanning of the abdomen and pelvis 10/28/2019..

 

FINDINGS:  

 

Bones:  Intraoperative evaluation for right sacroiliac joint fixation by transverse cancellous screws
, with inlet and outlet views) with partially visualized fractures of the right obturator ring, chron
icity uncertain, best seen on the inlet view. These appear to have been present on CT scanning 10/28/
2019.

 

Soft tissues:  Visualized bowel gas pattern is normal.  No suspicious soft tissue calcifications.  

 

IMPRESSION:  Sequence of 3 intraoperative images documenting right sacroiliac joint fracture fixation
 near normal anatomic alignment is established..

 

Reviewed by: Davy Swain MD on 7/15/2020 5:07 PM PDT

Approved by: Davy Swain MD on 7/15/2020 5:07 PM PDT

 

 

Station ID:  [id number]

## 2020-07-22 ENCOUNTER — HOSPITAL ENCOUNTER (EMERGENCY)
Dept: HOSPITAL 76 - ED | Age: 75
Discharge: ANOTHER HOSPITAL | End: 2020-07-22
Payer: MEDICARE

## 2020-07-22 VITALS — SYSTOLIC BLOOD PRESSURE: 97 MMHG | DIASTOLIC BLOOD PRESSURE: 80 MMHG

## 2020-07-22 DIAGNOSIS — T84.228A: Primary | ICD-10-CM
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
## 2020-07-22 DIAGNOSIS — M96.840: ICD-10-CM

## 2020-07-22 DIAGNOSIS — I10: ICD-10-CM

## 2020-07-22 LAB
ALBUMIN DIAFP-MCNC: 4.5 G/DL (ref 3.2–5.5)
ALBUMIN/GLOB SERPL: 1.3 {RATIO} (ref 1–2.2)
ALP SERPL-CCNC: 80 IU/L (ref 42–121)
ALT SERPL W P-5'-P-CCNC: 13 IU/L (ref 10–60)
ANION GAP SERPL CALCULATED.4IONS-SCNC: 10 MMOL/L (ref 6–13)
APTT PPP: 30.5 SECS (ref 24.9–33.3)
AST SERPL W P-5'-P-CCNC: 15 IU/L (ref 10–42)
BASOPHILS NFR BLD AUTO: 0 10^3/UL (ref 0–0.1)
BASOPHILS NFR BLD AUTO: 0.5 %
BILIRUB BLD-MCNC: 0.9 MG/DL (ref 0.2–1)
BUN SERPL-MCNC: 25 MG/DL (ref 6–20)
CALCIUM UR-MCNC: 9.7 MG/DL (ref 8.5–10.3)
CHLORIDE SERPL-SCNC: 101 MMOL/L (ref 101–111)
CLARITY UR REFRACT.AUTO: CLEAR
CO2 SERPL-SCNC: 30 MMOL/L (ref 21–32)
CREAT SERPLBLD-SCNC: 0.8 MG/DL (ref 0.4–1)
EOSINOPHIL # BLD AUTO: 0.3 10^3/UL (ref 0–0.7)
EOSINOPHIL NFR BLD AUTO: 3.9 %
ERYTHROCYTE [DISTWIDTH] IN BLOOD BY AUTOMATED COUNT: 14.7 % (ref 12–15)
GLOBULIN SER-MCNC: 3.6 G/DL (ref 2.1–4.2)
GLUCOSE SERPL-MCNC: 102 MG/DL (ref 70–100)
GLUCOSE UR QL STRIP.AUTO: NEGATIVE MG/DL
HGB UR QL STRIP: 12.7 G/DL (ref 12–16)
INR PPP: 1.1 (ref 0.8–1.2)
KETONES UR QL STRIP.AUTO: NEGATIVE MG/DL
LYMPHOCYTES # SPEC AUTO: 1.4 10^3/UL (ref 1.5–3.5)
LYMPHOCYTES NFR BLD AUTO: 20.8 %
MCH RBC QN AUTO: 27.4 PG (ref 27–31)
MCHC RBC AUTO-ENTMCNC: 31.7 G/DL (ref 32–36)
MCV RBC AUTO: 86.4 FL (ref 81–99)
MONOCYTES # BLD AUTO: 0.5 10^3/UL (ref 0–1)
MONOCYTES NFR BLD AUTO: 7.7 %
NEUTROPHILS # BLD AUTO: 4.3 10^3/UL (ref 1.5–6.6)
NEUTROPHILS # SNV AUTO: 6.5 X10^3/UL (ref 4.8–10.8)
NEUTROPHILS NFR BLD AUTO: 66.6 %
NITRITE UR QL STRIP.AUTO: POSITIVE
PDW BLD AUTO: 9.7 FL (ref 7.9–10.8)
PH UR STRIP.AUTO: 6.5 PH (ref 5–7.5)
PLATELET # BLD: 286 10^3/UL (ref 130–450)
PROT SPEC-MCNC: 8.1 G/DL (ref 6.7–8.2)
PROT UR STRIP.AUTO-MCNC: NEGATIVE MG/DL
PROTHROM ACT/NOR PPP: 12.3 SECS (ref 9.9–12.6)
RBC # UR STRIP.AUTO: NEGATIVE /UL
RBC MAR: 4.64 10^6/UL (ref 4.2–5.4)
SODIUM SERPLBLD-SCNC: 141 MMOL/L (ref 135–145)
SP GR UR STRIP.AUTO: 1.01 (ref 1–1.03)
SQUAMOUS URNS QL MICRO: (no result)
UROBILINOGEN UR QL STRIP.AUTO: (no result) E.U./DL
UROBILINOGEN UR STRIP.AUTO-MCNC: NEGATIVE MG/DL
WBC CLUMPS URNS QL MICRO: PRESENT

## 2020-07-22 PROCEDURE — 99284 EMERGENCY DEPT VISIT MOD MDM: CPT

## 2020-07-22 PROCEDURE — 81003 URINALYSIS AUTO W/O SCOPE: CPT

## 2020-07-22 PROCEDURE — 85025 COMPLETE CBC W/AUTO DIFF WBC: CPT

## 2020-07-22 PROCEDURE — 99285 EMERGENCY DEPT VISIT HI MDM: CPT

## 2020-07-22 PROCEDURE — 96374 THER/PROPH/DIAG INJ IV PUSH: CPT

## 2020-07-22 PROCEDURE — 81001 URINALYSIS AUTO W/SCOPE: CPT

## 2020-07-22 PROCEDURE — 72193 CT PELVIS W/DYE: CPT

## 2020-07-22 PROCEDURE — 80053 COMPREHEN METABOLIC PANEL: CPT

## 2020-07-22 PROCEDURE — 87086 URINE CULTURE/COLONY COUNT: CPT

## 2020-07-22 PROCEDURE — 87181 SC STD AGAR DILUTION PER AGT: CPT

## 2020-07-22 PROCEDURE — 85610 PROTHROMBIN TIME: CPT

## 2020-07-22 PROCEDURE — 85730 THROMBOPLASTIN TIME PARTIAL: CPT

## 2020-07-22 NOTE — ED PHYSICIAN DOCUMENTATION
History of Present Illness





- Stated complaint


Stated Complaint: UNKNOWN BRUISING





- Chief complaint


Chief Complaint: General





- History obtained from


History obtained from: Patient





- History of Present Illness


Timing: How many weeks ago (1)


Pain level max: 6


Pain level now: 5





- Additonal information


Additional information: 





75-year-old female presents to the emergency department stating that she had 

surgery in her pelvis for a pelvic fracture at [facility] in 

2019.  The surgery was performed by Dr. Anne. For the past few

weeks she is been noticing some pain in the hip, started having bruising a week 

ago that is steadily progressed since that time.  She saw her doctor last week 

and an x-ray was performed on 7/15.  She states that she was told the x-ray was 

"fine" and did not need any further follow-up.  Bruising and pain are 

increasing. Worse with movement, better with rest.  Not on blood thinners





Review of Systems


Ten Systems: 10 systems reviewed and negative


Constitutional: denies: Fever, Chills


GI: denies: Vomiting, Diarrhea


Skin: denies: Rash


Musculoskeletal: denies: Neck pain


Neurologic: denies: Focal weakness, Numbness, Headache





PD PAST MEDICAL HISTORY





- Past Medical History


Past Medical History: Yes


Cardiovascular: Hypertension, Murmur


Respiratory: None


Neuro: None


Endocrine/Autoimmune: None


GI: None, Other


GYN: None


: Incontinence


HEENT: Chronic vision loss, Other


Psych: Depression, Anxiety


Musculoskeletal: Osteoarthritis, Chronic back pain


Derm: None





- Past Surgical History


Past Surgical History: Yes


General: Colonoscopy


Ortho: Knee replacement, Other


/GYN:  section, Hysterectomy


HEENT: Cataracts





- Present Medications


Home Medications: 


                                Ambulatory Orders











 Medication  Instructions  Recorded  Confirmed


 


Ascorbic Acid [Vitamin C] 500 mg PO DAILY 03/15/20 06/12/20


 


Cholecalciferol (Vitamin D3) 2,000 unit PO DAILY 03/15/20 06/12/20





[Vitamin D3]   


 


Cyclobenzaprine HCl 5 mg PO Q6HR PRN 03/15/20 06/12/20


 


diphenhydrAMINE [Benadryl] 25 mg PO Q4-6H PRN #30 capsule 03/15/20 06/12/20


 


hydroCHLOROthiazide 25 mg DAILY 03/15/20 06/12/20





[Hydrochlorothiazide]   


 


Calcium Carbonate [Calcium] 600 mg PO BID 20


 


Diclofenac Sodium [Diclofenac 300 mg PO BID 20





Sodium ER]   


 


Furosemide 20 mg PO DAILY 20


 


Gabapentin 300 mg PO TID PRN 20


 


Simvastatin 10 mg PO QPM 20














- Allergies


Allergies/Adverse Reactions: 


                                    Allergies











Allergy/AdvReac Type Severity Reaction Status Date / Time


 


adhesive Allergy Intermediate WELTS/HIVES Verified 20 10:43


 


lisinopril AdvReac  cough Verified 20 10:43














- Social History


Does the pt smoke?: No


Smoking Status: Never smoker


Does the pt drink ETOH?: No


Does the pt have substance abuse?: No





- Immunizations


Immunizations are current?: Yes


Immunizations: TDAP >10years/unknown





PD ED PE NORMAL





- Vitals


Vital signs reviewed: Yes





- General


General: Alert and oriented X 3, No acute distress





- HEENT


HEENT: Moist mucous membranes





- Neck


Neck: Supple, no meningeal sign





- Cardiac


Cardiac: RRR





- Respiratory


Respiratory: No respiratory distress, Clear bilaterally





- Abdomen


Abdomen: Soft, Non tender, Non distended





- Derm


Derm: Warm and dry





- Extremities


Extremities: Other (Ecchymosis over the right hip with swelling and tenderness.)





- Neuro


Neuro: Alert and oriented X 3





Results





- Vitals


Vitals: 


                               Vital Signs - 24 hr











  20





  10:43 11:08 14:30


 


Temperature 37.2 C 36.8 C 36.7 C


 


Heart Rate 91 84 83


 


Respiratory 16 16 14





Rate   


 


Blood Pressure 118/88 H 154/80 H 137/92 H


 


O2 Saturation 96 93 97








                                     Oxygen











O2 Source                      Room air

















- Labs


Labs: 


                                Laboratory Tests











  20





  11:25 11:25 11:25


 


WBC  6.5  


 


RBC  4.64  


 


Hgb  12.7  


 


Hct  40.1  


 


MCV  86.4  


 


MCH  27.4  


 


MCHC  31.7 L  


 


RDW  14.7  


 


Plt Count  286  


 


MPV  9.7  


 


Neut # (Auto)  4.3  


 


Lymph # (Auto)  1.4 L  


 


Mono # (Auto)  0.5  


 


Eos # (Auto)  0.3  


 


Baso # (Auto)  0.0  


 


Absolute Nucleated RBC  0.00  


 


Nucleated RBC %  0.0  


 


PT   12.3 


 


INR   1.1 


 


APTT   30.5 


 


Sodium    141


 


Potassium    4.2


 


Chloride    101


 


Carbon Dioxide    30


 


Anion Gap    10.0


 


BUN    25 H


 


Creatinine    0.8


 


Estimated GFR (MDRD)    70 L


 


Glucose    102 H


 


Calcium    9.7


 


Total Bilirubin    0.9


 


AST    15


 


ALT    13


 


Alkaline Phosphatase    80


 


Total Protein    8.1


 


Albumin    4.5


 


Globulin    3.6


 


Albumin/Globulin Ratio    1.3


 


Urine Color   


 


Urine Clarity   


 


Urine pH   


 


Ur Specific Gravity   


 


Urine Protein   


 


Urine Glucose (UA)   


 


Urine Ketones   


 


Urine Occult Blood   


 


Urine Nitrite   


 


Urine Bilirubin   


 


Urine Urobilinogen   


 


Ur Leukocyte Esterase   


 


Urine RBC   


 


Urine WBC   


 


Urine WBC Clumps   


 


Ur Squamous Epith Cells   


 


Urine Bacteria   


 


Ur Microscopic Review   


 


Urine Culture Comments   














  20





  12:15


 


WBC 


 


RBC 


 


Hgb 


 


Hct 


 


MCV 


 


MCH 


 


MCHC 


 


RDW 


 


Plt Count 


 


MPV 


 


Neut # (Auto) 


 


Lymph # (Auto) 


 


Mono # (Auto) 


 


Eos # (Auto) 


 


Baso # (Auto) 


 


Absolute Nucleated RBC 


 


Nucleated RBC % 


 


PT 


 


INR 


 


APTT 


 


Sodium 


 


Potassium 


 


Chloride 


 


Carbon Dioxide 


 


Anion Gap 


 


BUN 


 


Creatinine 


 


Estimated GFR (MDRD) 


 


Glucose 


 


Calcium 


 


Total Bilirubin 


 


AST 


 


ALT 


 


Alkaline Phosphatase 


 


Total Protein 


 


Albumin 


 


Globulin 


 


Albumin/Globulin Ratio 


 


Urine Color  YELLOW


 


Urine Clarity  CLEAR


 


Urine pH  6.5


 


Ur Specific Gravity  1.010


 


Urine Protein  NEGATIVE


 


Urine Glucose (UA)  NEGATIVE


 


Urine Ketones  NEGATIVE


 


Urine Occult Blood  NEGATIVE


 


Urine Nitrite  POSITIVE H


 


Urine Bilirubin  NEGATIVE


 


Urine Urobilinogen  0.2 (NORMAL)


 


Ur Leukocyte Esterase  SMALL H


 


Urine RBC  None Seen


 


Urine WBC  6-10 H


 


Urine WBC Clumps  PRESENT


 


Ur Squamous Epith Cells  NONE SEEN


 


Urine Bacteria  Moderate H


 


Ur Microscopic Review  INDICATED


 


Urine Culture Comments  INDICATED














- Rads (name of study)


  ** CT pelvis


Radiology: Prelim report reviewed, EMP read contemporaneously





PD MEDICAL DECISION MAKING





- ED course


Complexity details: reviewed old records, reviewed results, re-evaluated 

patient, considered differential, d/w patient, d/w family, d/w consultant


ED course: 





I reviewed the images from  and 1 week ago.  In  the screw appears to 

have loosened approximately 2 cm.  On Brianne 15 it is loosened approximately 8 cm.

Appears that the bruising and pain is from the screw.  CT of the pelvis will be 

ordered.  I will then contact [facility] for further evaluation 





CT does show a hematoma with reversal of the screw, at approximately 9 to 10 cm.

Discussed the case with Dr. Benitez, orthopedics here who recommends transfer 

to [facility].  A consult at [facility] and Dr. Fernando from orthopedics 

graciously accepts in transfer. COBRA forms completed. 1400











1. Postsurgical changes from internal fixation of right sacroiliac joint. 

Reidentification of a slightly displaced right posterior iliac fracture and 

displaced right superior and inferior pubic rami fractures. No fracture or 

dislocation. 


2. 6 x 6.9 x 10.4 cm slightly hyperdense collection in deep subcutaneous tissue 

lateral to the right gluteus muscles and is surrounding proximal portion of the 

long internal fixation screw likely represent postsurgical seroma/hemangioma in 

this area. 


3. Likely organizing hematoma or seroma within posterior lateral right thigh 

deep soft tissue and measures up to 5.9 x 5.5 cm in transverse dimensions. 


4. No pelvic free fluid or free air. Ventral hernias containing bowel loops 

without incarceration. 





Departure





- Departure


Disposition: 02 Transfer Acute Care Hosp


Clinical Impression: 


 Hardware failure





Post-operative complication


Qualifiers:


 Surgical complication system/body Area: musculoskeletal system Surgical 

complication type: hematoma Procedure type: musculoskeletal Qualified Code(s): 

M96.840 - Postprocedural hematoma of a musculoskeletal structure following a 

musculoskeletal system procedure





Condition: Stable

## 2020-07-22 NOTE — CT REPORT
PROCEDURE:  PELVIS W

 

INDICATIONS:  R hip hematoma, surgery on 10/19

 

CONTRAST:  IV CONTRAST: Optiray 320 ml: 100 PO CONTRAST: *NO PO CONTRAST 

 

TECHNIQUE:  

After the administration of oral contrast and intravenous contrast, 5 mm thick sections acquired from
 the iliac crests to the symphysis.  5 mm thick coronal and sagittal reformats were acquired.  For ra
diation dose reduction, the following was used:  automated exposure control, adjustment of mA and/or 
kV according to patient size.

 

COMPARISON:  CT abdomen and pelvis dated 10/28/2019 and ultrasound of right thigh dated 4/11/2020.

 

FINDINGS:  

Image quality: Diagnostic. Significant thinning mourning artifacts from.  

 

Peritoneum and bowel: Noncontrast enhanced bowel loops show no wall thickening. No evidence of bowel 
obstruction. No free fluid or free air. Sigmoid diverticulosis is seen, no CT evidence of acute diver
ticulitis.

 

Genitourinary:  Bladder wall thickness is normal.  

 

Nodes and vessels:  No iliac, pelvic, or inguinal adenopathy.  Iliac vessels demonstrate normal size 
and enhancement.  

 

Bones: Patient is status post fixation of previously noted displaced posterior right iliac fracture a
djacent to sacroiliac joint with multiple fixation screws traversing sacroiliac joint. Fractures of r
ight superior and inferior pubic rami are again seen with slight displacement at fracture sites. No n
ew fracture or dislocation is seen.

 

Miscellaneous: Again noted are 2 ventral hernias containing segments of bowel loops without signs of 
incarceration. There is 6 x 6.9 x 10.4 cm slightly hyperdense area within subcutaneous soft tissue ad
jacent to lateral aspect of right hip at the level of long fixation screw likely represent organizing
 hematoma within the soft tissue. In addition, a more simple appearing fluid collection in posterior 
lateral upper thigh soft tissue is seen measures 5.9 x 5.5 cm in its largest AP and transverse dimens
ions.

 

IMPRESSION:  

 

1. Postsurgical changes from internal fixation of right sacroiliac joint. Reidentification of a sligh
tly displaced right posterior iliac fracture and displaced right superior and inferior pubic rami fra
ctures. No fracture or dislocation.

2. 6 x 6.9 x 10.4 cm slightly hyperdense collection in deep subcutaneous tissue lateral to the right 
gluteus muscles and is surrounding proximal portion of the long internal fixation screw likely repres
ent postsurgical seroma/hemangioma in this area.

3. Likely organizing hematoma or seroma within posterior lateral right thigh deep soft tissue and chelsea
sures up to 5.9 x 5.5 cm in transverse dimensions.

4. No pelvic free fluid or free air. Ventral hernias containing bowel loops without incarceration. 

 

Reviewed by: Yariel Saldana MD on 7/22/2020 1:08 PM PDT

Approved by: Yariel Saldana MD on 7/22/2020 1:08 PM PDT

 

 

Station ID:  [id number]

## 2020-09-17 ENCOUNTER — HOSPITAL ENCOUNTER (OUTPATIENT)
Dept: HOSPITAL 76 - COV | Age: 75
Discharge: HOME | End: 2020-09-17
Attending: SURGERY
Payer: MEDICARE

## 2020-09-17 DIAGNOSIS — Z20.828: ICD-10-CM

## 2020-09-17 DIAGNOSIS — M96.840: ICD-10-CM

## 2020-09-17 DIAGNOSIS — Z01.818: Primary | ICD-10-CM

## 2020-09-22 ENCOUNTER — HOSPITAL ENCOUNTER (OUTPATIENT)
Dept: HOSPITAL 76 - SDS | Age: 75
Discharge: HOME | End: 2020-09-22
Attending: SURGERY
Payer: MEDICARE

## 2020-09-22 VITALS — SYSTOLIC BLOOD PRESSURE: 130 MMHG | DIASTOLIC BLOOD PRESSURE: 58 MMHG

## 2020-09-22 DIAGNOSIS — E78.5: ICD-10-CM

## 2020-09-22 DIAGNOSIS — T79.2XXS: Primary | ICD-10-CM

## 2020-09-22 DIAGNOSIS — I10: ICD-10-CM

## 2020-09-22 DIAGNOSIS — I49.9: ICD-10-CM

## 2020-09-22 PROCEDURE — 10140 I&D HMTMA SEROMA/FLUID COLLJ: CPT

## 2020-09-22 NOTE — ANESTHESIA POST OP EVALUATION
Anesthesia Post Eval





- Post Anesthesia Eval


Vitals: 





                                Last Vital Signs











Temp  36.4 C L  09/22/20 10:30


 


Pulse  73   09/22/20 10:30


 


Resp  15   09/22/20 10:30


 


BP  130/58 L  09/22/20 10:30


 


Pulse Ox  95   09/22/20 10:30











CV Function Including HR & BP: positive: Stable


Pain Control: positive: Satisfactory


Nausea & Vomiting: positive: Negative


Mental Status: positive: Baseline


Respiratory Status: Airway Patent


Anesthesia Complications: positive: None (Discharged home. Did well)

## 2020-09-22 NOTE — OPERATIVE REPORT
Operative Report





- General


Procedure Date: 09/22/20


Planned Procedure: 





drain chronic seroma right thigh


Pre-Op Diagnosis: chronic seroma right thigh


Procedure Performed: 





drain seroma





Post Op Diagnosis: chronic seroma right thigh





- Procedure Note


Primary Surgeon: romain campa


Secondary Surgeon: none


Anesthesia Technique: MAC


Estimated Blood Loss (mL): 0


Drain/Tube Type: Stew drain


Complications: 





none

## 2020-09-22 NOTE — OPERATIVE REPORT
DATE OF SERVICE: 09/22/2020

Physician: Ross Koo MD

 

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

 

PROCEDURE PERFORMED:  Drainage of chronic seroma and placement of drain tube.

 

SURGEON:  Ross Koo MD

 

ASSISTANT:  None.

 

ANESTHESIA:  Monitored anesthesia care, IV sedation, local anesthesia.

 

COMPLICATIONS:  None.

 

SPECIMEN:  None.

 

ESTIMATED BLOOD LOSS:  None.

 

DRAINS:  A 15 round Stew drain.

 

FINDINGS:  As above, chronic seroma.

 

INDICATIONS FOR PROCEDURE:  The patient is a 75-year-old lady involved in a very
serious trauma nearly a year ago.  She had a pelvis fracture and soft tissue 
injury to her right thigh.  She continues to have chronic seroma.  Drainage in 
the operating room and placement drain was recommended.  Risks discussed, and 
alternatives discussed.  All questions answered and consent obtained.

 

DETAILS OF PROCEDURE:  The patient was properly identified and brought to the 
operating room and placed in supine position.  Right knee was propped up 
slightly.  She was prepped and draped in a sterile fashion.  Antibiotics were 
not given.  Local anesthetic was given.  A 1.5 cm vertical incision was made at 
the caudad aspect of the seroma cavity.  Seroma cavity measured approximately 20
x 6 cm.  Dissection proceeded down to the seroma cavity.  A 15 round Stew drain
was placed and then secured with a 3-0 nylon suture.  An additional vertical 
mattress 3-0 nylon suture was placed more cephalad, reapproximating the skin and
dermis.  A dressing was applied.  She tolerated the procedure well, was brought 
to recovery in good condition.

 

 

DD: 09/22/2020 10:11

TD: 09/22/2020 10:14

Job #: [id number]

[facility]

## 2020-09-22 NOTE — ANESTHESIA
Pre-Anesthesia VS, & Labs





- Diagnosis


Chronic Seroma of Leg





- Procedure





Place drain in leg


Vital Signs: 





                                        











Temp Pulse Resp BP Pulse Ox


 


 36.7 C   88   20   145/76 H  96 


 


 20 08:12  20 08:12  20 08:12  20 08:12  20 08:12











Height: 5 ft 3 in


Weight (kg): 87.8 kg


Body Mass Index: 34.2


BMI Classification: Obese





- Pregnancy


Is Patient Pregnant?: No





- Lab Results


Lab results reviewed: No





Home Medications and Allergies





                                        





Cholecalciferol (Vitamin D3) [Vitamin D3] 2,000 unit PO DAILY 03/15/20 


Cyclobenzaprine HCl 10 mg PO TID 03/15/20 


hydroCHLOROthiazide [Hydrochlorothiazide] 25 mg PO DAILY 03/15/20 


Diclofenac Sodium [Diclofenac Sodium ER] 75 mg PO BID PRN 20 


Simvastatin 10 mg PO QPM 20 








Allergies/Adverse Reactions: 


                                    Allergies











Allergy/AdvReac Type Severity Reaction Status Date / Time


 


adhesive Allergy Intermediate WELTS/HIVES Verified 20 10:43


 


lisinopril AdvReac  cough Verified 20 10:43














Anes History & Medical History





- Anesthetic History


Anesthesia Complications: reports: Post-Operative Nausea/Vomiting


Family history of Anesthesia Complications: Denies (Adopted so unknown)





- Medical History


Cardiovascular: reports: Hypertension, Murmur


Pulmonary: reports: None


Gastrointestinal: reports: Other (IBS-is stable at this time)


Urinary: reports: Incontinence


Neuro: reports: None


Musculoskeletal: reports: Osteoarthritis, Chronic back pain


Endocrine/Autoimmune: reports: None


Blood Disorders: reports: None


Skin: reports: None


Smoking Status: Never smoker


Psychosocial: reports: No issues indicated


History of Cancer?: No





- Surgical History


General: Colonoscopy


Eyes Ears Nose Throat (EENT): Cataracts


Cardiothoracic: Other (States long term irriegular heart rate)


Urologic: Bladder surgery


Gynecologic:  section, Hysterectomy


Orthopedic: Knee replacement, Other





Exam


General: Alert, Oriented x3, Cooperative, No acute distress


Dental: Dentures full Upper


Mouth Opening: Greater than 4 Fingerbreadths


Neck Mobility: Normal


Mallampati classification: II


Thyromental Distance: 4-6 cm


Respiratory: Lungs clear, Normal breath sounds, No respiratory distress, No 

accessory muscle use


Mental/Cognitive Status: Alert/Oriented X3, Normal for patient





Plan


Anesthesia Type: MAC


Consent for Procedure(s) Verified and Reviewed: Yes


Code Status: Attempt Resuscitation


ASA classification: 2-Mild systemic disease (Counciled on MAC, questions 

answered.)


Is this case an emergency?: No

## 2020-10-01 ENCOUNTER — HOSPITAL ENCOUNTER (OUTPATIENT)
Dept: HOSPITAL 76 - LAB.WCP | Age: 75
Discharge: HOME | End: 2020-10-01
Attending: PHYSICIAN ASSISTANT
Payer: MEDICARE

## 2020-10-01 ENCOUNTER — HOSPITAL ENCOUNTER (OUTPATIENT)
Dept: HOSPITAL 76 - LAB.R | Age: 75
Discharge: HOME | End: 2020-10-01
Attending: PHYSICIAN ASSISTANT
Payer: MEDICARE

## 2020-10-01 DIAGNOSIS — R31.9: Primary | ICD-10-CM

## 2020-10-01 DIAGNOSIS — E78.5: ICD-10-CM

## 2020-10-01 DIAGNOSIS — R31.9: ICD-10-CM

## 2020-10-01 DIAGNOSIS — I10: Primary | ICD-10-CM

## 2020-10-01 LAB
ALBUMIN DIAFP-MCNC: 3.6 G/DL (ref 3.2–5.5)
ALBUMIN/GLOB SERPL: 0.9 {RATIO} (ref 1–2.2)
ALP SERPL-CCNC: 89 IU/L (ref 42–121)
ALT SERPL W P-5'-P-CCNC: 15 IU/L (ref 10–60)
ANION GAP SERPL CALCULATED.4IONS-SCNC: 10 MMOL/L (ref 6–13)
AST SERPL W P-5'-P-CCNC: 15 IU/L (ref 10–42)
BASOPHILS NFR BLD AUTO: 0 10^3/UL (ref 0–0.1)
BASOPHILS NFR BLD AUTO: 0.3 %
BILIRUB BLD-MCNC: 1 MG/DL (ref 0.2–1)
BUN SERPL-MCNC: 27 MG/DL (ref 6–20)
CALCIUM UR-MCNC: 9.2 MG/DL (ref 8.5–10.3)
CHLORIDE SERPL-SCNC: 101 MMOL/L (ref 101–111)
CHOLEST SERPL-MCNC: 169 MG/DL
CO2 SERPL-SCNC: 30 MMOL/L (ref 21–32)
CREAT SERPLBLD-SCNC: 0.8 MG/DL (ref 0.4–1)
EOSINOPHIL # BLD AUTO: 0.2 10^3/UL (ref 0–0.7)
EOSINOPHIL NFR BLD AUTO: 1.8 %
ERYTHROCYTE [DISTWIDTH] IN BLOOD BY AUTOMATED COUNT: 14.2 % (ref 12–15)
GLOBULIN SER-MCNC: 4.1 G/DL (ref 2.1–4.2)
GLUCOSE SERPL-MCNC: 96 MG/DL (ref 70–100)
HDLC SERPL-MCNC: 48 MG/DL
HDLC SERPL: 3.5 {RATIO} (ref ?–4.4)
HGB UR QL STRIP: 13.2 G/DL (ref 12–16)
LDLC SERPL CALC-MCNC: 95 MG/DL
LDLC/HDLC SERPL: 2 {RATIO} (ref ?–4.4)
LYMPHOCYTES # SPEC AUTO: 0.9 10^3/UL (ref 1.5–3.5)
LYMPHOCYTES NFR BLD AUTO: 9.7 %
MCH RBC QN AUTO: 27.8 PG (ref 27–31)
MCHC RBC AUTO-ENTMCNC: 31.1 G/DL (ref 32–36)
MCV RBC AUTO: 89.7 FL (ref 81–99)
MONOCYTES # BLD AUTO: 0.6 10^3/UL (ref 0–1)
MONOCYTES NFR BLD AUTO: 6.2 %
NEUTROPHILS # BLD AUTO: 7.8 10^3/UL (ref 1.5–6.6)
NEUTROPHILS # SNV AUTO: 9.5 X10^3/UL (ref 4.8–10.8)
NEUTROPHILS NFR BLD AUTO: 81.7 %
PDW BLD AUTO: 10.6 FL (ref 7.9–10.8)
PLATELET # BLD: 283 10^3/UL (ref 130–450)
PROT SPEC-MCNC: 7.7 G/DL (ref 6.7–8.2)
RBC MAR: 4.74 10^6/UL (ref 4.2–5.4)
SODIUM SERPLBLD-SCNC: 141 MMOL/L (ref 135–145)
VLDLC SERPL-SCNC: 26 MG/DL

## 2020-10-01 PROCEDURE — 36415 COLL VENOUS BLD VENIPUNCTURE: CPT

## 2020-10-01 PROCEDURE — 81002 URINALYSIS NONAUTO W/O SCOPE: CPT

## 2020-10-01 PROCEDURE — 83721 ASSAY OF BLOOD LIPOPROTEIN: CPT

## 2020-10-01 PROCEDURE — 87181 SC STD AGAR DILUTION PER AGT: CPT

## 2020-10-01 PROCEDURE — 80061 LIPID PANEL: CPT

## 2020-10-01 PROCEDURE — 80053 COMPREHEN METABOLIC PANEL: CPT

## 2020-10-01 PROCEDURE — 87086 URINE CULTURE/COLONY COUNT: CPT

## 2020-10-01 PROCEDURE — 85025 COMPLETE CBC W/AUTO DIFF WBC: CPT

## 2020-10-23 ENCOUNTER — HOSPITAL ENCOUNTER (OUTPATIENT)
Dept: HOSPITAL 76 - DI | Age: 75
Discharge: HOME | End: 2020-10-23
Attending: SURGERY
Payer: MEDICARE

## 2020-10-23 DIAGNOSIS — T79.2XXS: Primary | ICD-10-CM

## 2020-10-23 PROCEDURE — 76882 US LMTD JT/FCL EVL NVASC XTR: CPT

## 2020-10-23 NOTE — ULTRASOUND REPORT
PROCEDURE:  Ext Limited Non Vascular

 

INDICATIONS:  TRAUMATIC 2NDARY AND RECURRENT HEMORRHAGE AND SERO

 

TECHNIQUE:  Real-time scanning was performed of the right lateral thigh, with image documentation.  

 

COMPARISON:  None.

 

FINDINGS:  A multiseptated right lateral thigh fluid collection measures 20.9 x 4.5 x 9.1 cm, little 
changed from a fluid collection present 4/11/2020.

 

IMPRESSION:  Recurrent fluid collection discussed above may in fact represent a lymphocele, multisept
ated, rather than a simple seroma or hematoma. Currently the absence of hyperemia argues against infe
ction superimposed.

 

Reviewed by: Davy Swain MD on 10/23/2020 7:02 PM PDT

Approved by: Davy Swain MD on 10/23/2020 7:02 PM PDT

 

 

Station ID:  [id number]

## 2020-12-31 ENCOUNTER — HOSPITAL ENCOUNTER (OUTPATIENT)
Dept: HOSPITAL 76 - LAB.WCP | Age: 75
Discharge: HOME | End: 2020-12-31
Attending: PHYSICIAN ASSISTANT
Payer: MEDICARE

## 2020-12-31 DIAGNOSIS — E78.5: Primary | ICD-10-CM

## 2020-12-31 DIAGNOSIS — D50.9: ICD-10-CM

## 2020-12-31 DIAGNOSIS — I10: ICD-10-CM

## 2020-12-31 LAB
ALBUMIN DIAFP-MCNC: 3.8 G/DL (ref 3.2–5.5)
ALBUMIN/GLOB SERPL: 1 {RATIO} (ref 1–2.2)
ALP SERPL-CCNC: 84 IU/L (ref 42–121)
ALT SERPL W P-5'-P-CCNC: 15 IU/L (ref 10–60)
ANION GAP SERPL CALCULATED.4IONS-SCNC: 8 MMOL/L (ref 6–13)
AST SERPL W P-5'-P-CCNC: 15 IU/L (ref 10–42)
BASOPHILS NFR BLD AUTO: 0 10^3/UL (ref 0–0.1)
BASOPHILS NFR BLD AUTO: 0.3 %
BILIRUB BLD-MCNC: 0.9 MG/DL (ref 0.2–1)
BUN SERPL-MCNC: 21 MG/DL (ref 6–20)
CALCIUM UR-MCNC: 9.4 MG/DL (ref 8.5–10.3)
CHLORIDE SERPL-SCNC: 99 MMOL/L (ref 101–111)
CHOLEST SERPL-MCNC: 216 MG/DL
CO2 SERPL-SCNC: 31 MMOL/L (ref 21–32)
CREAT SERPLBLD-SCNC: 0.7 MG/DL (ref 0.4–1)
EOSINOPHIL # BLD AUTO: 0.2 10^3/UL (ref 0–0.7)
EOSINOPHIL NFR BLD AUTO: 2.3 %
ERYTHROCYTE [DISTWIDTH] IN BLOOD BY AUTOMATED COUNT: 15.3 % (ref 12–15)
GFRSERPLBLD MDRD-ARVRAT: 82 ML/MIN/{1.73_M2} (ref 89–?)
GLOBULIN SER-MCNC: 3.9 G/DL (ref 2.1–4.2)
GLUCOSE SERPL-MCNC: 109 MG/DL (ref 70–100)
HCT VFR BLD AUTO: 42 % (ref 37–47)
HDLC SERPL-MCNC: 40 MG/DL
HDLC SERPL: 5.4 {RATIO} (ref ?–4.4)
HGB UR QL STRIP: 12.9 G/DL (ref 12–16)
LDLC SERPL CALC-MCNC: 122 MG/DL
LDLC/HDLC SERPL: 3.1 {RATIO} (ref ?–4.4)
LYMPHOCYTES # SPEC AUTO: 1.7 10^3/UL (ref 1.5–3.5)
LYMPHOCYTES NFR BLD AUTO: 23.7 %
MCH RBC QN AUTO: 26.2 PG (ref 27–31)
MCHC RBC AUTO-ENTMCNC: 30.7 G/DL (ref 32–36)
MCV RBC AUTO: 85.2 FL (ref 81–99)
MONOCYTES # BLD AUTO: 0.5 10^3/UL (ref 0–1)
MONOCYTES NFR BLD AUTO: 6.8 %
NEUTROPHILS # BLD AUTO: 4.9 10^3/UL (ref 1.5–6.6)
NEUTROPHILS # SNV AUTO: 7.4 X10^3/UL (ref 4.8–10.8)
NEUTROPHILS NFR BLD AUTO: 66.6 %
NRBC # BLD AUTO: 0 /100WBC
NRBC # BLD AUTO: 0 X10^3/UL
PDW BLD AUTO: 10.4 FL (ref 7.9–10.8)
PLATELET # BLD: 297 10^3/UL (ref 130–450)
POTASSIUM SERPL-SCNC: 3.5 MMOL/L (ref 3.5–5)
PROT SPEC-MCNC: 7.7 G/DL (ref 6.7–8.2)
RBC MAR: 4.93 10^6/UL (ref 4.2–5.4)
SODIUM SERPLBLD-SCNC: 138 MMOL/L (ref 135–145)
TRIGL P FAST SERPL-MCNC: 268 MG/DL
VLDLC SERPL-SCNC: 54 MG/DL

## 2020-12-31 PROCEDURE — 83721 ASSAY OF BLOOD LIPOPROTEIN: CPT

## 2020-12-31 PROCEDURE — 36415 COLL VENOUS BLD VENIPUNCTURE: CPT

## 2020-12-31 PROCEDURE — 80061 LIPID PANEL: CPT

## 2020-12-31 PROCEDURE — 85025 COMPLETE CBC W/AUTO DIFF WBC: CPT

## 2020-12-31 PROCEDURE — 80053 COMPREHEN METABOLIC PANEL: CPT

## 2021-01-07 ENCOUNTER — HOSPITAL ENCOUNTER (OUTPATIENT)
Dept: HOSPITAL 76 - DI.N | Age: 76
Discharge: HOME | End: 2021-01-07
Attending: GENERAL ACUTE CARE HOSPITAL
Payer: MEDICARE

## 2021-01-07 DIAGNOSIS — Z12.31: Primary | ICD-10-CM

## 2021-01-07 PROCEDURE — 77067 SCR MAMMO BI INCL CAD: CPT

## 2021-01-08 NOTE — MAMMOGRAPHY REPORT
BILATERAL DIGITAL SCREENING MAMMOGRAM 3D/2D: 1/7/2021

 

CLINICAL: Routine screening.  

 

Comparison is made to exams dated:  7/30/2014 mammogram and 2/21/2012 mammogram - [facility].  The tissue of both breasts is predominantly fatty.  

 

There are benign calcifications in the left breast.  

No significant masses, calcifications, or other findings are seen in either breast.  

There has been no significant interval change.

 

IMPRESSION: BENIGN

There is no mammographic evidence of malignancy. A 1 year screening mammogram is recommended.  

 

 

This exam was interpreted at Station ID: [id number].  

 

NOTE: For mammograms, a report in lay terms will be sent to the patient. Approximately 15% of breast 
malignancies will not be visualized mammographically. In the management of a palpable breast mass, a 
negative mammogram must not discourage biopsy of a clinically suspicious lesion.

 

Electronically Signed By: Be Cervantes          

acr/penrad:1/7/2021 11:46:27  

 

 

 

ACR BI-RADS Category 2: Benign Finding(s) 3342F

PARENCHYMAL PATTERN: (F) - The breast(s) demonstrate(s) diffuse fatty replacement.

BI-RADS CATEGORY: (2) - 2

RECOMMENDATION: (ANNUAL)  - Recommend routine annual screening mammography.

[id number]

1 year screening

LATERALITY: (B)

## 2021-01-13 NOTE — EXTERNAL MEDICAL SUMMARY RPT
Continuity of Care Document

                           Created on:2021



Patient:ANIA WHITFIELD

Sex:Female

:1945

External Reference #:[id number]





Demographics







                          Phone                     Unavailable

 

                          Preferred Language        English

 

                          Marital Status            Unknown

 

                          Episcopalian Affiliation     Unknown

 

                          Race                      Unknown

 

                          Ethnic Group              Unknown









Author







                          Organization              [facility]

 

                          Address                    [place] [address]

 

                          Phone                     [phone]









Support







                Name            Relationship    Address         Phone

 

                RETI            Unavailable     Unavailable     Unavailable









Care Team Providers







                    Name                Role                Phone

 

                    PA-C                Unavailable         Unavailable

 

                    Young               Unavailable         Unavailable









Problems







                     date                description         facility

 

                     2020-10-01 08:00    HYPERLIPIDEMIA, UNSPECIFIED  [facility]

[facility]

 

                     2020-10-01 08:00    ESSENTIAL (PRIMARY)  [facility]



                                        HYPERTENSION        

 

                     2020-10-01 08:00    LONG TERM (CURRENT) USE OF  [facility]

[facility]



                                        ANTICOAGULANTS      

 

                     2020-10-21 00:00:00  US EXTREMITY LIMITED NON  [facility]

[facility]



                                        VASCULAR            [facility]

 

                     2020-10-21 00:00:00  Health-related behavior  [facility]

 [facility]



                                                            [facility]

 

                     2020-10-21 00:00:00  Tobacco use and exposure  [facility]

[facility]



                                                            [facility]

 

                     2020-10-21 00:00:00  Exercise            [facility]

[facility]



                                                            [facility]

 

                     2020-10-21 00:00:00  Never smoker        [facility]

[facility]



                                                            [facility]

 

                     2020-10-21 00:00:00  Alcohol use         [facility]

[facility]



                                                            [facility]

 

                     2020-10-21 00:00:00  Tobacco smoking status NHIS  [facility]

[facility]



                                                            [facility]

 

                     2020-10-21 00:00:00  Total score?        [facility]

[facility]



                                                            [facility]

 

                     2020-10-23 16:49    TRAUMATIC SECONDARY AND  [facility]



                                        RECURRENT HEMOR AND SEROMA, 



                                        SEQUELA             

 

                     2020-10-23 17:15    TRAUMATIC SECONDARY AND  [facility]



                                        RECURRENT HEMOR AND SEROMA, 



                                        SEQUELA             

 

                     2020 00:00:00  Neuralgia, neuritis, and  [facility]

h [facility]



                                        radiculitis, unspecified [facility]

 

                     2020 00:00:00  Neuralgia and neuritis,  [facility]

 Primary Care



                                        unspecified         [facility]

 

                     2020 00:00:00  [facility] behavior  [facility]



                                                            [facility]

 

                     2020 00:00:00  Tobacco use and exposure  [facility]



                                                            [facility]

 

                     2020 00:00:00  Neuropathic pain    [facility]

uma Care



                                                            [facility]

 

                     2020 00:00:00  Exercise            [facility]



                                                            [facility]

 

                     2020 00:00:00  Never smoker        [facility]



                                                            [facility]

 

                     2020 00:00:00  Alcohol use         [facility]

uma Care



                                                            [facility]

 

                     2020 00:00:00  Tobacco smoking status NHIS  [facility]



                                                            [facility]

 

                     2020 00:00:00  Total score?        [facility]

 

                     2020 00:00:00  DEXA BONE DENSITY COMPLETE  [facility]



                                                            [facility]

 

                     2020 00:00:00  COMPREHENSIVE METABOLIC PANEL  [facility] Primary Care



                                                            [facility]

 

                     2020 00:00:00  LIPIDS SCREEN       [facility]

 

                     2020 00:00:00  CBC W/Diff/Plt      [facility]

 

                     2020 00:00    IRON DEFICIENCY ANEMIA,  [facility]



                                        UNSPECIFIED         

 

                     2020 00:00    HYPERLIPIDEMIA, UNSPECIFIED  [facility]

 

                     2020 00:00    ESSENTIAL (PRIMARY)  [facility]



                                        HYPERTENSION        

 

                     2020 08:00    IRON DEFICIENCY ANEMIA,  [facility]



                                        UNSPECIFIED         

 

                     2020 08:00    HYPERLIPIDEMIA, UNSPECIFIED  [facility]

 

                     2020 08:00    ESSENTIAL (PRIMARY)  [facility]



                                        HYPERTENSION        

 

                     2021 11:00    ENCNTR SCREEN MAMMOGRAM FOR  [facility]



                                        MALIGNANT NEOPLASM OF BREAST 

 

                     2021 10:50    ENCNTR SCREEN MAMMOGRAM FOR  [facility]



                                        MALIGNANT NEOPLASM OF BREAST 







Allergies







                     date                description         facility

 

                                         NO KNOWN ENVIRONMENTAL ALLERGIES  [facility]

 

                                         NO KNOWN ENVIRONMENTAL ALLERGIES  [facility]

 

                                         PEANUT              [facility]

[facility]

 

                                         lisinopril          [facility]

[facility]

 

                                         adhesive            [facility]

[facility]







Medications







                     date                description         facility

 

                     2020 00:00:00  null                [facility]

[facility]

 

                     2020 00:00:00  null                [facility]

[facility]

 

                     2020 00:00:00  IBUPROFEN           [facility]

[facility]

 

                     2020 00:00:00  IBUPROFEN           [facility]

[facility]







Procedures







                     date                description         facility

 

                     2020-10-21 00:00:00  US EXTREMITY LIMITED NON  [facility]

h [facility]



                                        VASCULAR            [facility]









                     date                description         facility

 

                     2020-10-21 00:00:00                      [facility]

[facility]









                     date                description         facility

 

                     2020 00:00:00  COMPREHENSIVE METABOLIC PANEL  [facility]



                                                            [facility]









                     date                description         facility

 

                     2020 00:00:00  LIPIDS SCREEN       [facility]

[facility]









                     date                description         facility

 

                     2020 00:00:00  CBC W/Diff/Plt      [facility]

[facility]









                     date                description         facility

 

                     2020 00:00:00                      [facility]

[facility]







Results





Social History







                     date                description         facility

 

                     2020-10-21 00:00:00  Never smoker        [facility]

[facility]









                     date                description         facility

 

                     2020 00:00:00  Never smoker        [facility]

[facility]







Social History







                     date                description         facility

 

                     2020-10-21 00:00:00  Never smoker        [facility]

[facility]









                     date                description         facility

 

                     2020 00:00:00  Never smoker        [facility]

[facility]









                     date                description         facility

 

                     [id number] no indicators present

## 2021-03-31 ENCOUNTER — HOSPITAL ENCOUNTER (OUTPATIENT)
Dept: HOSPITAL 76 - EMS | Age: 76
Discharge: ANOTHER HOSPITAL | End: 2021-03-31
Attending: EMERGENCY MEDICINE
Payer: MEDICARE

## 2021-03-31 ENCOUNTER — HOSPITAL ENCOUNTER (EMERGENCY)
Dept: HOSPITAL 76 - ED | Age: 76
LOS: 1 days | Discharge: HOME | End: 2021-04-01
Payer: MEDICARE
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
## 2021-03-31 DIAGNOSIS — M25.511: ICD-10-CM

## 2021-03-31 DIAGNOSIS — S16.1XXA: ICD-10-CM

## 2021-03-31 DIAGNOSIS — S42.017A: Primary | ICD-10-CM

## 2021-03-31 DIAGNOSIS — I10: ICD-10-CM

## 2021-03-31 DIAGNOSIS — S01.01XA: ICD-10-CM
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
## 2021-03-31 DIAGNOSIS — M47.812: ICD-10-CM

## 2021-03-31 DIAGNOSIS — S01.01XA: Primary | ICD-10-CM

## 2021-03-31 PROCEDURE — 73000 X-RAY EXAM OF COLLAR BONE: CPT

## 2021-03-31 PROCEDURE — 72125 CT NECK SPINE W/O DYE: CPT

## 2021-03-31 PROCEDURE — 70450 CT HEAD/BRAIN W/O DYE: CPT

## 2021-03-31 PROCEDURE — 12001 RPR S/N/AX/GEN/TRNK 2.5CM/<: CPT

## 2021-03-31 PROCEDURE — 99284 EMERGENCY DEPT VISIT MOD MDM: CPT

## 2021-03-31 NOTE — XRAY REPORT
PROCEDURE:  Clavicle RT

 

INDICATIONS:  fall and struck shoulder/pain mid clavicle

 

TECHNIQUE:  2 views of the clavicle were acquired.  

 

COMPARISON:  None.

 

FINDINGS: 

Bones:  No fractures or dislocations.  No suspicious bony lesions.  

 

Soft tissues:  No suspicious soft tissue calcifications.  

 

IMPRESSION:  

No acute displaced right clavicular fracture noted.

 

Reviewed by: Brian Calderon MD on 3/31/2021 9:58 PM PDT

Approved by: Brian Calderon MD on 3/31/2021 9:58 PM PDT

 

 

Station ID:  [id number]

## 2021-03-31 NOTE — CT REPORT
PROCEDURE:  HEAD WO

 

INDICATIONS:  fall, struck head; pain head/anterior neck

 

TECHNIQUE:  

Noncontrast 4.5 mm thick angled axial sections acquired from the foramen magnum to the vertex.  For r
adiation dose reduction, the following was used:  automated exposure control, adjustment of mA and/or
 kV according to patient size.

 

COMPARISON:  10/28/2019

 

FINDINGS:  

Image quality:  Excellent.  

 

CSF spaces:  Basal cisterns are patent.  No extra-axial fluid collections.  Ventricles are normal in 
size and shape.  

 

Brain:  No midline shift.  No intracranial masses or hemorrhage.  Gray-white matter interface is norm
al. Age-related volume loss. 

 

Skull and face:  Calvarium and visualized facial bones are intact, without suspicious lesions.  

 

Sinuses:  Visualized sinuses and mastoids are clear.  

 

IMPRESSION:  

1. No evidence acute stroke, hemorrhage, or mass.

2. No evidence of significant intracranial sequelae of acute trauma.

 

Reviewed by: Brian Calderon MD on 3/31/2021 10:00 PM PDT

Approved by: Brian Calderon MD on 3/31/2021 10:00 PM PDT

 

 

Station ID:  [id number]

## 2021-03-31 NOTE — ED PHYSICIAN DOCUMENTATION
PD HPI Fall





- Stated complaint


Stated Complaint: GLF





- Chief complaint


Chief Complaint: Trauma Ext





- History obtained from


History obtained from: Patient





- History of Present Illness


Mechanism of injury: Tripped


Fall distance: Standing position


Where injury occurred: Home


Timing - onset: Today (shortly PTA, she states she started walking in bedroom 

and tripped on the leg of her Lorenz Bed and fell forward/right. Pain at right 

side of head/scalp with bleeding. Also pain right anterolateral neck at SCM 

muscle area, and pain right medial clavicle. No pain in chest nor abd.)


Injury(ies) location: Head, Neck (right anterolateral), Right Upper Extremity 

(clavicle area).  No: Chest, Abdomen


Associated symptoms: No: LOC, AMS, Weakness, Paresthesias, Nausea / vomiting


Worsens with: Movement (right shoulder movement hurts at collarbone.), Palpation

(of right side of scalp)


Similar symptoms before: Has not had sx before


Recently seen: Not recently seen





Review of Systems


Constitutional: denies: Fever, Chills


Nose: denies: Rhinorrhea / runny nose, Congestion


Throat: denies: Sore throat


Respiratory: denies: Cough


Skin: reports: Laceration (s) (right side of scalp)


Musculoskeletal: denies: Back pain, Extremity pain


Neurologic: reports: Head injury.  denies: Focal weakness, Numbness, Confused, 

Altered mental status, Headache, LOC





PD PAST MEDICAL HISTORY





- Past Medical History


Past Medical History: Yes


Cardiovascular: Hypertension, Murmur


Respiratory: None


Neuro: None


Endocrine/Autoimmune: None


GI: Other


GYN: None


: Incontinence


HEENT: Chronic vision loss, Other


Psych: Depression, Anxiety


Musculoskeletal: Osteoarthritis, Chronic back pain


Derm: None





- Past Surgical History


Past Surgical History: Yes


General: Colonoscopy


Ortho: Knee replacement, Other


/GYN:  section, Hysterectomy


Cardiovascular: Other


HEENT: Cataracts





- Present Medications


Home Medications: 


                                Ambulatory Orders











 Medication  Instructions  Recorded  Confirmed


 


Cholecalciferol (Vitamin D3) 2,000 unit PO DAILY 03/15/20 09/22/20





[Vitamin D3]   


 


Cyclobenzaprine HCl 10 mg PO TID 03/15/20 09/22/20


 


hydroCHLOROthiazide 25 mg PO DAILY 03/15/20 09/22/20





[Hydrochlorothiazide]   


 


Diclofenac Sodium [Diclofenac 75 mg PO BID PRN 20





Sodium ER]   


 


Simvastatin 10 mg PO QPM 20


 


cephALEXin [Keflex] 500 mg PO BID 7 Days #14 capsule 10/07/20 














- Allergies


Allergies/Adverse Reactions: 


                                    Allergies











Allergy/AdvReac Type Severity Reaction Status Date / Time


 


adhesive Allergy Intermediate WELTS/HIVES Verified 20 10:43


 


lisinopril AdvReac  cough Verified 20 10:43














- Social History


Does the pt smoke?: No


Smoking Status: Never smoker


Does the pt drink ETOH?: No


Does the pt have substance abuse?: No





- Immunizations


Immunizations are current?: Yes


Immunizations: TDAP >10years/unknown





- POLST


Patient has POLST: No





PD ED PE NORMAL





- Vitals


Vital signs reviewed: Yes





- General


General: Alert and oriented X 3, Well developed/nourished, Other (appears 

uncomfortable mainly due to guarded ROM of right shoulder. )





- HEENT


HEENT: PERRL, EOMI, Pharynx benign, Other (right parietal area with 2.5 cm scalp

 laceration to fatty tissue. No FB, no active bleeding. )





- Neck


Neck: Supple, no meningeal sign, No bony TTP, Other (some tenderness right 

anterior muscles from mastoid down to medial clavicle. And tender at medial 

clavicle mostly. )





- Cardiac


Cardiac: RRR, No murmur





- Respiratory


Respiratory: Clear bilaterally, Other (no chestwall tenderness.)





- Abdomen


Abdomen: Soft, Non tender





- Back


Back: No spinal TTP





- Derm


Derm: Normal color, Warm and dry





- Neuro


Neuro: Alert and oriented X 3, CNs 2-12 intact, No motor deficit, No sensory 

deficit, Normal speech


Eye Opening: Spontaneous


Motor: [facility] Commands


Verbal: Oriented


GCS Score: 15





Results





- Vitals


Vitals: 


                               Vital Signs - 24 hr











  21





  22:50 00:00


 


Heart Rate 80 72


 


Respiratory 14 14





Rate  


 


Blood Pressure 117/70 115/66


 


O2 Saturation 99 98








                                     Oxygen











O2 Source                      Room air

















- Rads (name of study)


  ** head CT


Radiology: Prelim report reviewed (no bleeding/fractures), See rad report





  ** cervical spine CT


Radiology: Prelim report reviewed (no fractures), See rad report





  ** right clavicle


Radiology: Prelim report reviewed (comminuted right medial clavicle fracture. 

minimally displaced.), See rad report





Procedures





- Laceration (location)


  ** right posterior parietal scalp


Length in cm: 2.5


Wound type: Linear, Into subcut fat, Clean


Anesthesia: Lidocaine 2% with epi


Wound preparation: Irrigated copiously NS


Skin layer closure: Staples


Other: Patient tolerated well, No complications, Neurovascular intact, Dressing 

applied, Tetanus UTD





PD MEDICAL DECISION MAKING





- ED course


Complexity details: considered differential, d/w patient





Departure





- Departure


Disposition: 01 Home, Self Care


Clinical Impression: 


Fall from slip, trip, or stumble


Qualifiers:


 Encounter type: initial encounter Qualified Code(s): W01.0XXA - Fall on same 

level from slipping, tripping and stumbling without subsequent striking against 

object, initial encounter





Scalp laceration


Qualifiers:


 Encounter type: initial encounter Qualified Code(s): S01.01XA - Laceration 

without foreign body of scalp, initial encounter





Neck muscle strain


Qualifiers:


 Encounter type: initial encounter Qualified Code(s): S16.1XXA - Strain of 

muscle, fascia and tendon at neck level, initial encounter





Clavicle fracture, sternal end


Qualifiers:


 Encounter type: initial encounter Fracture type: closed Fracture alignment: 

nondisplaced Laterality: right Qualified Code(s): S42.017A - Nondisplaced 

fracture of sternal end of right clavicle, initial encounter for closed fracture





Condition: Stable


Record reviewed to determine appropriate education?: Yes


Instructions:  ED Fx Clavicle, ED Laceration Scalp Stitch Or Stap


Follow-Up: 


Josefina Ryan PA-C [Primary Care Provider] - 


Daniel Benitez MD [Provider Admit Priv/Credential] - 


Comments: 


It is okay to wash and shower. Clean off the wound twice a day with soap and 

water, or peroxide and water. Apply some antibiotic ointment to it to keep it 

moist. Also to watch for signs of infection such as purulence, redness or 

increasing pain. Return to your primary care or the ER at the specified time for

suture removal.





Staple removal in 8 to 10 days.





Your clavicle is broken on the sternal and.  It is not displaced.  We will treat

this with a sling as well as avoiding any overhead reaching, push pull, lifting.

 Gentle range of motion of the shoulder itself is okay so it does not stiffen 

up.





Follow-up with orthopedics in about 1-1 and half weeks, call tomorrow for an 

appointment.  Want to ensure this heals up in its proper position.





Your head and neck CT scans did not show any signs of internal bleeding in the 

brain compartment nor any fractures of the neck.





Ibuprofen or Tylenol as needed for pains.  Ice periodically to the collarbone 

area to reduce swelling and pain as well.


Discharge Date/Time: 21 00:30

## 2021-03-31 NOTE — CT REPORT
PROCEDURE:  CERVICAL SPINE WO

 

INDICATIONS:  fall, struck head; pain head/anterior neck

 

TECHNIQUE:  

Noncontrast 3 mm thick sections acquired from the skull base to the T4 level.  Sagittal and coronal r
eformats were then constructed.  For radiation dose reduction, the following was used:  automated exp
osure control, adjustment of mA and/or kV according to patient size.

 

COMPARISON:  None.

 

FINDINGS:  

Image quality:  Excellent.  

 

Bones: No acute cervical fracture or dislocation. There is a comminuted displaced fracture of the rig
ht clavicular head with overriding. Visualized superior ribs are intact. Moderate cervical spondylosi
s. 

 

Soft tissues:  Prevertebral soft tissues are normal in thickness.  No paravertebral hematomas.  No ap
ical pneumothoraces.  Hematoma involving the right sternocleidomastoid muscle associated with the cla
vicular head fracture.

 

IMPRESSION:  

1. No evidence acute cervical fracture or dislocation.

2. Cervical spondylosis.

3. Comminuted displaced clavicular head and neck fracture on the right, with overriding.

 

Reviewed by: Brian Calderon MD on 3/31/2021 10:04 PM PDT

Approved by: Brian Calderon MD on 3/31/2021 10:04 PM PDT

 

 

Station ID:  [id number]

## 2021-04-01 VITALS — SYSTOLIC BLOOD PRESSURE: 115 MMHG | DIASTOLIC BLOOD PRESSURE: 66 MMHG

## 2021-04-07 NOTE — EXTERNAL MEDICAL SUMMARY RPT
Continuity of Care Document

                            Created on:2021



Patient:ANIA WHITFIELD

Sex:Female

:1945

External Reference #:[id number]





Demographics







                          Phone                     Unavailable

 

                          Preferred Language        Unknown

 

                          Marital Status            Unknown

 

                          Yazidi Affiliation     Unknown

 

                          Race                      Unknown

 

                          Ethnic Group              Unknown









Author







                          Organization              [facility]

 

                          Address                    [place] [id number]

 

                          Phone                     [phone]









Social History







                     date                description         facility

 

                     [phone]

## 2021-04-07 NOTE — EXTERNAL MEDICAL SUMMARY RPT
Continuity of Care Document

                            Created on:2021



Patient:ANIA WHITFIELD

Sex:Female

:1945

External Reference #:[id number]





Demographics







                          Phone                     Unavailable

 

                          Preferred Language        Unknown

 

                          Marital Status            Unknown

 

                          Synagogue Affiliation     Unknown

 

                          Race                      Unknown

 

                          Ethnic Group              Unknown









Author







                          Organization              [facility]

 

                          Address                    [place] [id number]

 

                          Phone                     [phone]









Social History







                     date                description         facility

 

                     [phone]

## 2021-05-24 ENCOUNTER — HOSPITAL ENCOUNTER (OUTPATIENT)
Dept: HOSPITAL 76 - DI.N | Age: 76
Discharge: HOME | End: 2021-05-24
Attending: ORTHOPAEDIC SURGERY
Payer: MEDICARE

## 2021-05-24 DIAGNOSIS — M19.011: Primary | ICD-10-CM

## 2021-05-24 NOTE — XRAY REPORT
PROCEDURE:  Clavicle RT

 

INDICATIONS:  NONDISPLACED FX OF SHAFT OF R CLAVICLE

 

TECHNIQUE:  2 views of the clavicle were acquired.  

 

COMPARISON:  3/31/2021 right clavicle plain films..

 

FINDINGS: 

Bones:  No fractures or dislocations.  No suspicious bony lesions.  There is mild to moderate AC join
t osteoarthritis.

 

Soft tissues:  No suspicious soft tissue calcifications.  

 

IMPRESSION:  

Mild to moderate AC joint osteoarthritis. No trauma found.

 

Reviewed by: Davy Swain MD on 5/24/2021 5:05 PM PDT

Approved by: Davy Swain MD on 5/24/2021 5:05 PM PDT

 

 

Station ID:  [id number]

## 2021-07-08 ENCOUNTER — HOSPITAL ENCOUNTER (OUTPATIENT)
Dept: HOSPITAL 76 - DI.N | Age: 76
Discharge: HOME | End: 2021-07-08
Attending: ORTHOPAEDIC SURGERY
Payer: MEDICARE

## 2021-07-08 DIAGNOSIS — S42.001D: Primary | ICD-10-CM

## 2021-07-08 NOTE — XRAY REPORT
PROCEDURE:  Clavicle RT

 

INDICATIONS:  NONDISPLACED FX OF SHAFT OF R CLAVICLE

 

TECHNIQUE:  2 views of the clavicle were acquired.  

 

COMPARISON:  Clavicle x-rays 5/24/2021 and 3/31/2021, CT cervical spine 3/31/2021

 

FINDINGS: 

Bones:  Mild osseous irregularity is seen in the medial clavicle near the clavicular head correspondi
ng to in the prior fracture best seen on CT cervical spine from 3/31/2021, which is not well evaluate
d due to multiple overlapping osseous structures. The alignment does not appear significantly changed
 given limitations in evaluation. Mild-to-moderate acromioclavicular osteoarthrosis is again noted.

 

Soft tissues:  No suspicious soft tissue calcifications.  Mild aortic atherosclerotic calcifications.


 

IMPRESSION:  

Known clavicular head fracture is not well evaluated radiographically due to superimposed osseous str
uctures, although the overall alignment does not appear significantly changed.

 

Reviewed by: Thai Novak MD on 7/8/2021 11:45 AM PDT

Approved by: Thai Novak MD on 7/8/2021 11:45 AM PDT

 

 

Station ID:  [id number]

## 2021-10-13 ENCOUNTER — HOSPITAL ENCOUNTER (INPATIENT)
Dept: HOSPITAL 76 - MS3 | Age: 76
LOS: 1 days | Discharge: HOME | DRG: 355 | End: 2021-10-14
Attending: SURGERY | Admitting: SURGERY
Payer: MEDICARE

## 2021-10-13 DIAGNOSIS — R26.2: ICD-10-CM

## 2021-10-13 DIAGNOSIS — R32: ICD-10-CM

## 2021-10-13 DIAGNOSIS — K43.6: Primary | ICD-10-CM

## 2021-10-13 DIAGNOSIS — E66.9: ICD-10-CM

## 2021-10-13 DIAGNOSIS — M54.9: ICD-10-CM

## 2021-10-13 DIAGNOSIS — I10: ICD-10-CM

## 2021-10-13 DIAGNOSIS — E78.5: ICD-10-CM

## 2021-10-13 DIAGNOSIS — I49.9: ICD-10-CM

## 2021-10-13 DIAGNOSIS — Z96.652: ICD-10-CM

## 2021-10-13 DIAGNOSIS — G89.29: ICD-10-CM

## 2021-10-13 DIAGNOSIS — K21.9: ICD-10-CM

## 2021-10-13 DIAGNOSIS — Z79.899: ICD-10-CM

## 2021-10-13 PROCEDURE — 0WUF0JZ SUPPLEMENT ABDOMINAL WALL WITH SYNTHETIC SUBSTITUTE, OPEN APPROACH: ICD-10-PCS | Performed by: SURGERY

## 2021-10-13 RX ADMIN — POTASSIUM CHLORIDE, DEXTROSE MONOHYDRATE AND SODIUM CHLORIDE SCH MLS/HR: 150; 5; 450 INJECTION, SOLUTION INTRAVENOUS at 13:10

## 2021-10-13 RX ADMIN — SODIUM CHLORIDE, PRESERVATIVE FREE SCH: 5 INJECTION INTRAVENOUS at 18:33

## 2021-10-13 RX ADMIN — SODIUM CHLORIDE, PRESERVATIVE FREE SCH: 5 INJECTION INTRAVENOUS at 23:52

## 2021-10-13 RX ADMIN — POTASSIUM CHLORIDE, DEXTROSE MONOHYDRATE AND SODIUM CHLORIDE SCH MLS/HR: 150; 5; 450 INJECTION, SOLUTION INTRAVENOUS at 22:52

## 2021-10-13 NOTE — OPERATIVE REPORT
Operative Report





- General


Admit Date: 10/13/21


Procedure Date: 10/13/21


Planned Procedure: 





open repair incarcerated ventral hernia with mesh


Pre-Op Diagnosis: incarcerated ventral hernia


Procedure Performed: 





open repair incarcerated ventral hernia with mesh


Post Op Diagnosis: incarcerated ventral hernia





- Procedure Note


Primary Surgeon: romain campa md


Secondary Surgeon: none


Anesthesia Technique: General ET tube, Local


Pathology: 





none.  skin and hernia sac not sent


Estimated Blood Loss (mL): 25


Drain/Tube Type: Other (none)


Indications: 





painful hernia bulge with colon


Findings: 





as above


4 x 6 cm defect


2.5 x 4.5 inch polypropylene mesh preperitoneal





- Other


Other Information/Narrative: 





The patient was properly identified brought to the operating room and placed in 

supine position.  Sequential compression devices were placed.  General 

endotracheal anesthesia was induced.  She was prepped and draped in a sterile 

fashion and given preoperative antibiotics.  Local anesthetic was given 

throughout the procedure.  She had an incarcerated periumbilical ventral hernia 

with colon.  Skin had been stretched and was purple in color.  An elliptical 8 

cm x 5 cm incision was made periumbilical area.  Umbilical skin and subcutaneous

tissue was removed down to the hernia sac.  The hernia sac measured 

approximately 8 x 10 cm.  Fascial defect measured approximately 4 x 6 cm.  

Peritoneum was opened.  The fascia caudad was opened slightly to allow reduction

of omentum and colon.  Redundant peritoneum was excised.  The preperitoneal 

space was then carefully developed.  More cephalad and right lateral the 

peritoneum was quite thin.  The peritoneum was closed with 2 running 3-0 Vicryl 

suture and further secured to the underlying fascia or musculature cephalad.  

Good closure was obtained.  Patient had laxity of her abdominal wall.  

Approximately 2-1/2 inch wide by 4-1/2 inch tall polypropylene mesh was placed 

and secured with multiple interrupted 0 Ethibond sutures.  Sutures were placed 

approximately 4 cm back from the fascial defect edge in all directions.  The 

mesh lay in good position.  Fascia was then closed over the mesh with three-

point interrupted 0 Ethibond sutures.  Subcutaneous tissue was closed in three 

layers.  Two layers of interrupted 2-0 Vicryl suture were placed.  Buried 

interrupted subdermal 3-0 Vicryl sutures were then placed.  Skin was closed with

a running 4-0 Monocryl subcuticular suture.  Dressing was applied.  There were 

no complications and counts were correct.  She was awakened and brought to 

recovery in good condition.

## 2021-10-13 NOTE — ANESTHESIA
Pre-Anesthesia VS, & Labs





- Diagnosis





umbilical hernia





- Procedure





umbilical hernia repair


Vital Signs: 





                                        











Temp Pulse Resp BP Pulse Ox


 


 36.2 C L  99   16   159/84 H  97 


 


 10/13/21 08:00  10/13/21 08:00  10/13/21 08:00  10/13/21 08:00  10/13/21 08:00











Height: 5 ft 3 in


Weight (kg): 90.7 kg


Body Mass Index: 35.4


BMI Classification: Obese





- NPO


>8 hours





- Pregnancy


Is Patient Pregnant?: No





Home Medications and Allergies


Home Medications: 


Ambulatory Orders





Ascorbic Acid [Vitamin C] 250 mg PO DAILY 10/11/21 


Gabapentin [Neurontin] 100 mg PO QPM 10/11/21 


Lactobacillus Combination No.4 [Probiotic] 1 each PO DAILY PRN 10/11/21 


Loratadine [Claritin] 10 mg PO DAILY PRN 10/11/21 


Oxybutynin Chloride [Ditropan Xl] 5 mg PO DAILY 10/11/21 











                                        





Cholecalciferol (Vitamin D3) [Vitamin D3] 2,000 unit PO DAILY 03/15/20 


Cyclobenzaprine HCl 10 mg PO TID PRN 03/15/20 


hydroCHLOROthiazide [Hydrochlorothiazide] 25 mg PO DAILY 03/15/20 


Ascorbic Acid [Vitamin C] 250 mg PO DAILY 10/11/21 


Gabapentin [Neurontin] 100 mg PO QPM 10/11/21 


Lactobacillus Combination No.4 [Probiotic] 1 each PO DAILY PRN 10/11/21 


Loratadine [Claritin] 10 mg PO DAILY PRN 10/11/21 


Oxybutynin Chloride [Ditropan Xl] 5 mg PO DAILY 10/11/21 








Allergies/Adverse Reactions: 


                                    Allergies











Allergy/AdvReac Type Severity Reaction Status Date / Time


 


adhesive Allergy Intermediate WELTS/HIVES Verified 20 10:43


 


lisinopril AdvReac  cough Verified 20 10:43














Anes History & Medical History





- Anesthetic History


Anesthesia Complications: reports: No previous complications


Family history of Anesthesia Complications: Denies


Family history of Malignant Hyperthermia: Denies





- Medical History


Cardiovascular: reports: Hypertension, Murmur


Pulmonary: reports: None


Gastrointestinal: reports: Other


Urinary: reports: Incontinence


Neuro: reports: None


Musculoskeletal: reports: Osteoarthritis, Chronic back pain


Endocrine/Autoimmune: reports: None


Blood Disorders: reports: None


Skin: reports: None


Smoking Status: Never smoker





- Surgical History


General: reports: Colonoscopy


Eyes Ears Nose Throat (EENT): reports: Cataracts


Cardiothoracic: reports: Other


Urologic: reports: Bladder surgery


Gynecologic: reports:  section, Hysterectomy


Orthopedic: reports: Knee replacement, Other





Exam


General: Alert, Oriented x3


Dental: Dentures full Upper


Mouth Opening: 3 Fingerbreadth


Neck Mobility: Normal


Mallampati classification: III


Thyromental Distance: 4-6 cm


Respiratory: Lungs clear


Cardiovascular: Regular rate





Plan


Anesthesia Type: General


Consent for Procedure(s) Verified and Reviewed: Yes


Code Status: Attempt Resuscitation


ASA classification: 3-Severe systemic disease


Is this case an emergency?: No

## 2021-10-13 NOTE — PHARMACY PROGRESS NOTE
- Best Possible Medication History


Admit Date and Time: 10/13/21 0749


Processed by: Nursing


Medication History completed: Yes


Patient Interview: Completed (MED REC COMPLETED BY NURSING)





As the person ultimately responsible for medication therapy, providers are able 

to order a medication from an existing home medication list in [facility] via the 

"Reconcile Routine" prior to Confirmation of that medication by support staff. 

Such practice is discouraged except when the physician, in their clinical 

judgment, deems that a medical need exists for a medication without regard to 

previous use.

## 2021-10-14 VITALS — SYSTOLIC BLOOD PRESSURE: 133 MMHG | DIASTOLIC BLOOD PRESSURE: 56 MMHG

## 2021-10-14 RX ADMIN — SODIUM CHLORIDE, PRESERVATIVE FREE SCH: 5 INJECTION INTRAVENOUS at 08:45

## 2021-10-14 RX ADMIN — POTASSIUM CHLORIDE, DEXTROSE MONOHYDRATE AND SODIUM CHLORIDE SCH MLS/HR: 150; 5; 450 INJECTION, SOLUTION INTRAVENOUS at 08:45

## 2021-10-14 NOTE — DISCHARGE SUMMARY
Discharge Summary


Admit Date: 10/13/21


Discharge Date: 10/14/21


Discharging Provider: romain campa md


Code Status: Attempt Resuscitation


Discharge Facility Name: [facility]





- DIAGNOSES


Admission Diagnoses: 





incarcerated ventral hernia


Discharge Diagnoses with Status of Each Condition: 





She had repair of incarcerated with colon ventral hernia day of admission 

10/13/2021.  D/c home in good condition 10/14/2021





- HPI


History of Present Illness: 





Ventral hernia for years.  Getting worse in size and symptoms.





- CONSULTS | PROCEDURES


Procedures: 





Open ventral hernia repair with mesh 10/13/2021.





- HOSPITAL COURSE


Hospital Course: 





As above.  Home tolerating diet, pain manageable, and ambulating safely.





- ALLERGIES


Allergies/Adverse Reactions: 


                                    Allergies











Allergy/AdvReac Type Severity Reaction Status Date / Time


 


adhesive Allergy Intermediate WELTS/HIVES Verified 07/22/20 10:43


 


lisinopril AdvReac  cough Verified 07/22/20 10:43














- MEDICATIONS


Home Medications: 


                                Ambulatory Orders











 Medication  Instructions  Recorded  Confirmed


 


Cholecalciferol (Vitamin D3) 2,000 unit PO DAILY 03/15/20 10/13/21





[Vitamin D3]   


 


Cyclobenzaprine HCl 10 mg PO TID PRN 03/15/20 10/13/21


 


hydroCHLOROthiazide 25 mg PO DAILY 03/15/20 10/13/21





[Hydrochlorothiazide]   


 


Ascorbic Acid [Vitamin C] 250 mg PO DAILY 10/11/21 10/13/21


 


Gabapentin [Neurontin] 100 mg PO QPM 10/11/21 10/13/21


 


Lactobacillus Combination No.4 1 each PO DAILY PRN 10/11/21 10/11/21





[Probiotic]   


 


Loratadine [Claritin] 10 mg PO DAILY PRN 10/11/21 10/13/21


 


Oxybutynin Chloride [Ditropan Xl] 5 mg PO DAILY 10/11/21 10/13/21














- PHYSICAL EXAM AT DISCHARGE


General Appearance: positive: No acute distress, Alert


Eyes Bilateral: positive: PERRL, EOMI


ENT: positive: No signs of dehydration


Neck: positive: No JVD


Respiratory: positive: No respiratory distress


Abdomen: positive: Non-tender, No distention, Other (dressing c/d/i.  no 

erythema)


Neurologic/Psychiatric: positive: Oriented x3





- FOLLOW UP


Follow Up: 





Surgery

## 2021-10-14 NOTE — DISCHARGE PLAN
Discharge Plan


Problem Reviewed?: Yes


Disposition: 01 Home, Self Care


Condition: Good


Diet: Regular


Activity Restrictions: No Restrictions


Shower Restrictions: No


Driving Restrictions: No


Plan of Treatment: 


open repair incarcerated with colon ventral hernia repair 10/13/2021


Assessment: 


doing well following surgery


Additional Instructions or Follow Up instructions: 


call the surgery office with any concerns


diet and activity as tolerated


leave the dressing on for about a week


ok to shower and get the dressing wet





[phone]


No Smoking: If you smoke, Please STOP!  Call [phone] for help.


Follow-up with: 


Josefina Ryan PA-C [Primary Care Provider] -

## 2021-11-06 ENCOUNTER — HOSPITAL ENCOUNTER (OUTPATIENT)
Dept: HOSPITAL 76 - DI.N | Age: 76
Discharge: HOME | End: 2021-11-06
Attending: NURSE PRACTITIONER
Payer: MEDICARE

## 2021-11-06 DIAGNOSIS — R07.81: Primary | ICD-10-CM

## 2021-11-06 DIAGNOSIS — S42.002D: ICD-10-CM

## 2021-11-06 DIAGNOSIS — S42.001D: ICD-10-CM

## 2021-11-06 NOTE — XRAY REPORT
PROCEDURE:  Ribs w/PA Chest RT

 

INDICATIONS:  RIGHT RIB PAIN S/P FALL 1 WEEK AGO

 

TECHNIQUE:  2 views of the right ribs were acquired, along with a single view chest.  

 

COMPARISON:  Correlation is made with clavicle plain films, 7/8/2021. Correlation is also made with r
eport only (no images) from prior chest CT, 10/20/2019.

 

FINDINGS:  

 

Surgical changes and devices:  None.  

 

Bones and chest wall: No displaced rib fractures are identified. No suspicious lytic or blastic lesio
ns are seen.  

 

There are remote fractures of the right medial clavicle on the left mid clavicle.

 

Lungs and pleura:  No pleural effusions or pneumothorax.  Lungs appear clear.  

 

Mediastinum:  The aorta is prominent and tortuous. The cardiac contours are within normal limits.  

 

 

 

 

IMPRESSION:  

 

No displaced rib fractures are seen.

 

Remote clavicle fractures.

 

If there is strong clinical concern for a post traumatic abnormality that is not seen on this plain f
ilm study, then please consider a dedicated chest CT with IV contrast for further evaluation.  

 

Reviewed by: Cuauhtemoc Payton MD on 11/6/2021 2:47 PM [facility]

Approved by: Cuauhtemoc Payton MD on 11/6/2021 2:47 PM [facility]

 

 

Station ID:  [id number]

## 2021-12-02 ENCOUNTER — HOSPITAL ENCOUNTER (OUTPATIENT)
Dept: HOSPITAL 76 - LAB.WCP | Age: 76
Discharge: HOME | End: 2021-12-02
Attending: PHYSICIAN ASSISTANT
Payer: MEDICARE

## 2021-12-02 DIAGNOSIS — E78.5: Primary | ICD-10-CM

## 2021-12-02 DIAGNOSIS — I10: ICD-10-CM

## 2021-12-02 DIAGNOSIS — E53.8: ICD-10-CM

## 2021-12-02 DIAGNOSIS — G62.9: ICD-10-CM

## 2021-12-02 LAB
ALBUMIN DIAFP-MCNC: 3.8 G/DL (ref 3.2–5.5)
ALBUMIN/GLOB SERPL: 1.1 {RATIO} (ref 1–2.2)
ALP SERPL-CCNC: 89 IU/L (ref 42–121)
ALT SERPL W P-5'-P-CCNC: 12 IU/L (ref 10–60)
ANION GAP SERPL CALCULATED.4IONS-SCNC: 9 MMOL/L (ref 6–13)
AST SERPL W P-5'-P-CCNC: 14 IU/L (ref 10–42)
BASOPHILS NFR BLD AUTO: 0 10^3/UL (ref 0–0.1)
BASOPHILS NFR BLD AUTO: 0.5 %
BILIRUB BLD-MCNC: 0.6 MG/DL (ref 0.2–1)
BUN SERPL-MCNC: 23 MG/DL (ref 6–20)
CALCIUM UR-MCNC: 9.6 MG/DL (ref 8.5–10.3)
CHLORIDE SERPL-SCNC: 101 MMOL/L (ref 101–111)
CHOLEST SERPL-MCNC: 216 MG/DL
CO2 SERPL-SCNC: 30 MMOL/L (ref 21–32)
CREAT SERPLBLD-SCNC: 0.6 MG/DL (ref 0.4–1)
EOSINOPHIL # BLD AUTO: 0.2 10^3/UL (ref 0–0.7)
EOSINOPHIL NFR BLD AUTO: 3 %
ERYTHROCYTE [DISTWIDTH] IN BLOOD BY AUTOMATED COUNT: 14.2 % (ref 12–15)
FOLATE SERPL-MCNC: 12.42 NG/ML
GFRSERPLBLD MDRD-ARVRAT: 97 ML/MIN/{1.73_M2} (ref 89–?)
GLOBULIN SER-MCNC: 3.5 G/DL (ref 2.1–4.2)
GLUCOSE SERPL-MCNC: 86 MG/DL (ref 70–100)
HCT VFR BLD AUTO: 41.3 % (ref 37–47)
HDLC SERPL-MCNC: 42 MG/DL
HDLC SERPL: 5.1 {RATIO} (ref ?–4.4)
HGB UR QL STRIP: 12.9 G/DL (ref 12–16)
LDLC SERPL CALC-MCNC: 132 MG/DL
LDLC/HDLC SERPL: 3.1 {RATIO} (ref ?–4.4)
LYMPHOCYTES # SPEC AUTO: 1.7 10^3/UL (ref 1.5–3.5)
LYMPHOCYTES NFR BLD AUTO: 27.6 %
MCH RBC QN AUTO: 26.9 PG (ref 27–31)
MCHC RBC AUTO-ENTMCNC: 31.2 G/DL (ref 32–36)
MCV RBC AUTO: 86 FL (ref 81–99)
MONOCYTES # BLD AUTO: 0.5 10^3/UL (ref 0–1)
MONOCYTES NFR BLD AUTO: 8 %
NEUTROPHILS # BLD AUTO: 3.7 10^3/UL (ref 1.5–6.6)
NEUTROPHILS # SNV AUTO: 6.1 X10^3/UL (ref 4.8–10.8)
NEUTROPHILS NFR BLD AUTO: 60.7 %
NRBC # BLD AUTO: 0 /100WBC
NRBC # BLD AUTO: 0 X10^3/UL
PDW BLD AUTO: 10.7 FL (ref 7.9–10.8)
PLATELET # BLD: 279 10^3/UL (ref 130–450)
POTASSIUM SERPL-SCNC: 4 MMOL/L (ref 3.5–5)
PROT SPEC-MCNC: 7.3 G/DL (ref 6.7–8.2)
RBC MAR: 4.8 10^6/UL (ref 4.2–5.4)
SODIUM SERPLBLD-SCNC: 140 MMOL/L (ref 135–145)
TRIGL P FAST SERPL-MCNC: 209 MG/DL
VIT B12 SERPL-MCNC: 168 PG/ML (ref 180–914)
VLDLC SERPL-SCNC: 42 MG/DL

## 2021-12-02 PROCEDURE — 83721 ASSAY OF BLOOD LIPOPROTEIN: CPT

## 2021-12-02 PROCEDURE — 80061 LIPID PANEL: CPT

## 2021-12-02 PROCEDURE — 84155 ASSAY OF PROTEIN SERUM: CPT

## 2021-12-02 PROCEDURE — 85025 COMPLETE CBC W/AUTO DIFF WBC: CPT

## 2021-12-02 PROCEDURE — 82746 ASSAY OF FOLIC ACID SERUM: CPT

## 2021-12-02 PROCEDURE — 80053 COMPREHEN METABOLIC PANEL: CPT

## 2021-12-02 PROCEDURE — 36415 COLL VENOUS BLD VENIPUNCTURE: CPT

## 2021-12-02 PROCEDURE — 84165 PROTEIN E-PHORESIS SERUM: CPT

## 2021-12-02 PROCEDURE — 82607 VITAMIN B-12: CPT

## 2021-12-04 LAB
ALBUMIN SERPL-MCNC: 3.9 G/DL (ref 3.8–4.8)
ALPHA1 GLOB SERPL ELPH-MCNC: 0.3 G/DL (ref 0.2–0.3)
ALPHA2 GLOB SERPL ELPH-MCNC: 0.9 G/DL (ref 0.5–0.9)
BETA1 GLOB FLD ELPH-MCNC: 0.5 G/DL (ref 0.4–0.6)
BETA2 GLOB FLD ELPH-MCNC: 0.5 G/DL (ref 0.2–0.5)
GAMMA GLOB SERPL ELPH-MCNC: 0.9 G/DL (ref 0.8–1.7)
IMP & REVIEW OF LAB RESULTS: (no result)

## 2022-01-28 ENCOUNTER — HOSPITAL ENCOUNTER (OUTPATIENT)
Dept: HOSPITAL 76 - LAB.N | Age: 77
End: 2022-01-28
Attending: PHYSICIAN ASSISTANT
Payer: MEDICARE

## 2022-01-28 DIAGNOSIS — R39.9: Primary | ICD-10-CM

## 2022-01-28 PROCEDURE — 87086 URINE CULTURE/COLONY COUNT: CPT

## 2022-01-28 PROCEDURE — 87181 SC STD AGAR DILUTION PER AGT: CPT

## 2022-02-02 ENCOUNTER — HOSPITAL ENCOUNTER (OUTPATIENT)
Dept: HOSPITAL 76 - DI.N | Age: 77
Discharge: HOME | End: 2022-02-02
Attending: GENERAL ACUTE CARE HOSPITAL
Payer: MEDICARE

## 2022-02-02 DIAGNOSIS — Z12.31: Primary | ICD-10-CM

## 2022-02-03 NOTE — MAMMOGRAPHY REPORT
BILATERAL DIGITAL SCREENING MAMMOGRAM 3D/2D: 2/2/2022

 

CLINICAL: Routine screening.  

 

Comparison is made to exams dated:  1/7/2021 mammogram, 7/30/2014 mammogram, and 2/21/2012 mammogram 
- [facility].  The tissue of both breasts is predominantly fatty.  

 

There are benign calcifications in both breasts.  

No significant masses, calcifications, or other findings are seen in either breast.  

There has been no significant interval change.

 

IMPRESSION: BENIGN

There is no mammographic evidence of malignancy. A 1 year screening mammogram is recommended.  

 

 

This exam was interpreted at Station ID: [id number].  

 

NOTE: For mammograms, a report in lay terms will be sent to the patient. Approximately 15% of breast 
malignancies will not be visualized mammographically. In the management of a palpable breast mass, a 
negative mammogram must not discourage biopsy of a clinically suspicious lesion.

 

Electronically Signed By: Be Cervantes          

acr/penrad:2/2/2022 15:41:31  

 

 

 

ACR BI-RADS Category 2: Benign Finding(s) 3342F

PARENCHYMAL PATTERN: (F) - The breast(s) demonstrate(s) diffuse fatty replacement.

BI-RADS CATEGORY: (2) - 2

RECOMMENDATION: (ANNUAL)  - Recommend routine annual screening mammography.

[id number]

1 year screening

LATERALITY: (B)

## 2022-11-10 ENCOUNTER — HOSPITAL ENCOUNTER (OUTPATIENT)
Dept: HOSPITAL 76 - LAB.N | Age: 77
Discharge: HOME | End: 2022-11-10
Attending: PHYSICIAN ASSISTANT
Payer: MEDICARE

## 2022-11-10 DIAGNOSIS — E78.5: ICD-10-CM

## 2022-11-10 DIAGNOSIS — I10: Primary | ICD-10-CM

## 2022-11-10 LAB
ALBUMIN DIAFP-MCNC: 4.2 G/DL (ref 3.2–5.5)
ALBUMIN/GLOB SERPL: 1.1 {RATIO} (ref 1–2.2)
ALP SERPL-CCNC: 88 IU/L (ref 42–121)
ALT SERPL W P-5'-P-CCNC: 11 IU/L (ref 10–60)
ANION GAP SERPL CALCULATED.4IONS-SCNC: 9 MMOL/L (ref 6–13)
AST SERPL W P-5'-P-CCNC: 17 IU/L (ref 10–42)
BILIRUB BLD-MCNC: 0.6 MG/DL (ref 0.2–1)
BUN SERPL-MCNC: 22 MG/DL (ref 6–20)
CALCIUM UR-MCNC: 10.1 MG/DL (ref 8.5–10.3)
CHLORIDE SERPL-SCNC: 100 MMOL/L (ref 101–111)
CHOLEST SERPL-MCNC: 145 MG/DL
CO2 SERPL-SCNC: 33 MMOL/L (ref 21–32)
CREAT SERPLBLD-SCNC: 0.7 MG/DL (ref 0.4–1)
GFRSERPLBLD MDRD-ARVRAT: 81 ML/MIN/{1.73_M2} (ref 89–?)
GLOBULIN SER-MCNC: 3.9 G/DL (ref 2.1–4.2)
GLUCOSE SERPL-MCNC: 75 MG/DL (ref 70–100)
HDLC SERPL-MCNC: 43 MG/DL
HDLC SERPL: 3.4 {RATIO} (ref ?–4.4)
LDLC SERPL CALC-MCNC: 67 MG/DL
LDLC/HDLC SERPL: 1.6 {RATIO} (ref ?–4.4)
POTASSIUM SERPL-SCNC: 3.8 MMOL/L (ref 3.5–5)
PROT SPEC-MCNC: 8.1 G/DL (ref 6.7–8.2)
SODIUM SERPLBLD-SCNC: 142 MMOL/L (ref 135–145)
TRIGL P FAST SERPL-MCNC: 173 MG/DL
VLDLC SERPL-SCNC: 35 MG/DL

## 2022-11-10 PROCEDURE — 36415 COLL VENOUS BLD VENIPUNCTURE: CPT

## 2022-11-10 PROCEDURE — 80053 COMPREHEN METABOLIC PANEL: CPT

## 2022-11-10 PROCEDURE — 83721 ASSAY OF BLOOD LIPOPROTEIN: CPT

## 2022-11-10 PROCEDURE — 80061 LIPID PANEL: CPT

## 2023-08-29 ENCOUNTER — HOSPITAL ENCOUNTER (OUTPATIENT)
Dept: HOSPITAL 76 - LAB | Age: 78
Discharge: HOME | End: 2023-08-29
Attending: PHYSICIAN ASSISTANT
Payer: MEDICARE

## 2023-08-29 DIAGNOSIS — I10: Primary | ICD-10-CM

## 2023-08-29 DIAGNOSIS — E53.8: ICD-10-CM

## 2023-08-29 DIAGNOSIS — E78.5: ICD-10-CM

## 2023-08-29 LAB
ALBUMIN DIAFP-MCNC: 4.3 G/DL (ref 3.2–5.5)
ALBUMIN/GLOB SERPL: 1.4 {RATIO} (ref 1–2.2)
ALP SERPL-CCNC: 78 IU/L (ref 42–121)
ALT SERPL W P-5'-P-CCNC: 10 IU/L (ref 10–60)
ANION GAP SERPL CALCULATED.4IONS-SCNC: 5 MMOL/L (ref 6–13)
AST SERPL W P-5'-P-CCNC: 12 IU/L (ref 10–42)
BASOPHILS NFR BLD AUTO: 0 10^3/UL (ref 0–0.1)
BASOPHILS NFR BLD AUTO: 0.6 %
BILIRUB BLD-MCNC: 0.6 MG/DL (ref 0.2–1)
BUN SERPL-MCNC: 21 MG/DL (ref 6–20)
CALCIUM UR-MCNC: 10.1 MG/DL (ref 8.5–10.3)
CHLORIDE SERPL-SCNC: 101 MMOL/L (ref 101–111)
CHOLEST SERPL-MCNC: 142 MG/DL
CO2 SERPL-SCNC: 34 MMOL/L (ref 21–32)
CREAT SERPLBLD-SCNC: 0.8 MG/DL (ref 0.6–1.3)
EOSINOPHIL # BLD AUTO: 0.2 10^3/UL (ref 0–0.7)
EOSINOPHIL NFR BLD AUTO: 3.4 %
ERYTHROCYTE [DISTWIDTH] IN BLOOD BY AUTOMATED COUNT: 13.9 % (ref 12–15)
GFRSERPLBLD MDRD-ARVRAT: 70 ML/MIN/{1.73_M2} (ref 89–?)
GLOBULIN SER-MCNC: 3.1 G/DL (ref 2.1–4.2)
GLUCOSE SERPL-MCNC: 103 MG/DL (ref 74–104)
HCT VFR BLD AUTO: 45.5 % (ref 37–47)
HDLC SERPL-MCNC: 51 MG/DL
HDLC SERPL: 2.8 {RATIO} (ref ?–4.4)
HGB UR QL STRIP: 14.2 G/DL (ref 12–16)
LDLC SERPL CALC-MCNC: 61 MG/DL
LDLC/HDLC SERPL: 1.2 {RATIO} (ref ?–4.4)
LYMPHOCYTES # SPEC AUTO: 1.4 10^3/UL (ref 1.5–3.5)
LYMPHOCYTES NFR BLD AUTO: 27.9 %
MCH RBC QN AUTO: 27 PG (ref 27–31)
MCHC RBC AUTO-ENTMCNC: 31.2 G/DL (ref 32–36)
MCV RBC AUTO: 86.7 FL (ref 81–99)
MONOCYTES # BLD AUTO: 0.4 10^3/UL (ref 0–1)
MONOCYTES NFR BLD AUTO: 8.6 %
NEUTROPHILS # BLD AUTO: 3 10^3/UL (ref 1.5–6.6)
NEUTROPHILS # SNV AUTO: 5 X10^3/UL (ref 4.8–10.8)
NEUTROPHILS NFR BLD AUTO: 59.3 %
NRBC # BLD AUTO: 0 /100WBC
NRBC # BLD AUTO: 0 X10^3/UL
PDW BLD AUTO: 10.2 FL (ref 7.9–10.8)
PLATELET # BLD: 245 10^3/UL (ref 130–450)
POTASSIUM SERPL-SCNC: 4.1 MMOL/L (ref 3.5–4.5)
PROT SPEC-MCNC: 7.4 G/DL (ref 6.4–8.9)
RBC MAR: 5.25 10^6/UL (ref 4.2–5.4)
SODIUM SERPLBLD-SCNC: 140 MMOL/L (ref 135–145)
TRIGL P FAST SERPL-MCNC: 148 MG/DL (ref 48–352)
VLDLC SERPL-SCNC: 30 MG/DL

## 2023-08-29 PROCEDURE — 82607 VITAMIN B-12: CPT

## 2023-08-29 PROCEDURE — 36415 COLL VENOUS BLD VENIPUNCTURE: CPT

## 2023-08-29 PROCEDURE — 85025 COMPLETE CBC W/AUTO DIFF WBC: CPT

## 2023-08-29 PROCEDURE — 80061 LIPID PANEL: CPT

## 2023-08-29 PROCEDURE — 80053 COMPREHEN METABOLIC PANEL: CPT

## 2023-08-29 PROCEDURE — 83721 ASSAY OF BLOOD LIPOPROTEIN: CPT
